# Patient Record
Sex: FEMALE | Race: WHITE | NOT HISPANIC OR LATINO | Employment: OTHER | URBAN - METROPOLITAN AREA
[De-identification: names, ages, dates, MRNs, and addresses within clinical notes are randomized per-mention and may not be internally consistent; named-entity substitution may affect disease eponyms.]

---

## 2017-01-04 ENCOUNTER — ALLSCRIPTS OFFICE VISIT (OUTPATIENT)
Dept: OTHER | Facility: OTHER | Age: 72
End: 2017-01-04

## 2017-01-27 ENCOUNTER — ALLSCRIPTS OFFICE VISIT (OUTPATIENT)
Dept: OTHER | Facility: OTHER | Age: 72
End: 2017-01-27

## 2017-01-27 DIAGNOSIS — E11.65 TYPE 2 DIABETES MELLITUS WITH HYPERGLYCEMIA (HCC): ICD-10-CM

## 2017-02-13 ENCOUNTER — ANESTHESIA (OUTPATIENT)
Dept: GASTROENTEROLOGY | Facility: HOSPITAL | Age: 72
End: 2017-02-13

## 2017-02-13 ENCOUNTER — ANESTHESIA EVENT (OUTPATIENT)
Dept: GASTROENTEROLOGY | Facility: HOSPITAL | Age: 72
End: 2017-02-13

## 2017-03-22 ENCOUNTER — ALLSCRIPTS OFFICE VISIT (OUTPATIENT)
Dept: OTHER | Facility: OTHER | Age: 72
End: 2017-03-22

## 2017-08-10 ENCOUNTER — LAB CONVERSION - ENCOUNTER (OUTPATIENT)
Dept: OTHER | Facility: OTHER | Age: 72
End: 2017-08-10

## 2017-08-10 LAB
CHOLEST SERPL-MCNC: 259 MG/DL (ref 125–200)
CHOLEST/HDLC SERPL: 8.6 (CALC)
CREATININE, RANDOM URINE (HISTORICAL): 141 MG/DL (ref 20–320)
HDLC SERPL-MCNC: 30 MG/DL
LDL CHOLESTEROL (HISTORICAL): ABNORMAL MG/DL (CALC)
MAGNESIUM, UR (HISTORICAL): 1.4 MG/DL
MICROALBUMIN/CREATININE RATIO (HISTORICAL): 10 MCG/MG CREAT
NON-HDL-CHOL (CHOL-HDL) (HISTORICAL): 229 MG/DL (CALC)
TRIGL SERPL-MCNC: 585 MG/DL

## 2017-08-14 ENCOUNTER — GENERIC CONVERSION - ENCOUNTER (OUTPATIENT)
Dept: OTHER | Facility: OTHER | Age: 72
End: 2017-08-14

## 2017-12-20 ENCOUNTER — ALLSCRIPTS OFFICE VISIT (OUTPATIENT)
Dept: OTHER | Facility: OTHER | Age: 72
End: 2017-12-20

## 2017-12-21 NOTE — PROGRESS NOTES
Assessment   1  Change in bowel habits (787 99) (R19 4)   2  Abdominal pain, epigastric (789 06) (R10 13)    Plan   Abdominal pain, epigastric    · EGD; Status:Active; Requested for:57Jwm7119;    Perform:[facility]; Due:23Bii9929; Last Updated By:Geovanni Farias; 12/20/2017 4:12:42 PM;Ordered; For:Abdominal pain, epigastric; Ordered By:Lore Walsh; Change in bowel habits    · Dulcolax 5 MG Oral Tablet Delayed Release; TAKE 2 TABLET ONCE   Rx By: Malena Bazan; Dispense: 1 Days ; #:2 Tablet Delayed Release; Refill: 0;For: Change in bowel habits; MARIA = N; Verified Transmission to [facility] [place] [address]; Last Updated By: System, [employer]; 12/20/2017 4:08:43 PM   · PEG-3350/Electrolytes 236 GM Oral Solution Reconstituted (Golytely); TAKE AS    DIRECTED   Rx By: Malena Bazan; Dispense: 0 Days ; #:1 X 4000 ML Bottle; Refill: 0;For: Change in bowel habits; MARIA = N; Verified Transmission to [facility] [facility]; Last Updated By: System, SureScripts; 12/20/2017 4:08:42 PM   · COLONOSCOPY (GI, SURG); Status:Active; Requested for:50Gwy8551;    Perform:[facility]; Due:62Vva9342; Last Updated By:Geovanni Farias; 12/20/2017 4:12:42 PM;Ordered; For:Change in bowel habits; Ordered By:Lore Walsh; Discussion/Summary   Discussion Summary:     Change in bowel habits with episodes of diarrheaâappeared to be most likely from diabetic autonomic neuropathy  Abdominal pain that has been resolved on Protonix  Reschedule for both EGD and colonoscopy   Patient was explained about the risks and benefits of the procedure  Risks including but not limited to bleeding, infection, perforation were explained in detail  Also explained about less than 100% sensitivity with the exam and other alternatives  Continue Protonix          Counseling Documentation With Imm: The patient was counseled regarding instructions for management,-- risk factor reductions,-- patient and family education,-- risks and benefits of treatment options,-- importance of compliance with treatment  Chief Complaint   Chief Complaint Free Text Note Form:  follow-up      History of Present Illness   HPI:  Dashawn Ocampo came in for regular follow-up  She was seen last year with symptoms of epigastric pain and chronic diarrhea  She was scheduled for EGD and colonoscopy at that time but she never came back because of ride issues  She denies any abdominal pain, nausea or vomiting  She takes Protonix regularly with help  She reports having episodes of loose to soft bowel movements, denies any blood or mucus in the stool  Good appetite, no recent weight loss  Her sister had colon cancer  History Reviewed: The history was obtained today from the patient and I agree with the documented history  Review of Systems   Complete-Female GI Adult:      Constitutional: No fever, no chills, feels well, no tiredness, no recent weight gain or weight loss  Eyes: No complaints of eye pain, no red eyes, no eyesight problems, no discharge, no dry eyes, no itching of eyes  ENT: no complaints of earache, no loss of hearing, no nose bleeds, no nasal discharge, no sore throat, no hoarseness  Cardiovascular: No complaints of slow heart rate, no fast heart rate, no chest pain, no palpitations, no leg claudication, no lower extremity edema  Respiratory: No complaints of shortness of breath, no wheezing, no cough, no SOB on exertion, no orthopnea, no PND  Gastrointestinal: as noted in HPI  Genitourinary: No complaints of dysuria, no incontinence, no pelvic pain, no dysmenorrhea, no vaginal discharge or bleeding  Musculoskeletal: arthralgias-- and-- limb pain, but-- no joint swelling,-- no myalgias,-- no joint stiffness-- and-- no limb swelling  Integumentary: No complaints of skin rash or lesions, no itching, no skin wounds, no breast pain or lump        Neurological: limb weakness-- and-- difficulty walking, but-- no headache,-- no numbness,-- no tingling,-- no confusion,-- no dizziness,-- no convulsions-- and-- no fainting  Psychiatric: Not suicidal, no sleep disturbance, no anxiety or depression, no change in personality, no emotional problems  Endocrine: No complaints of proptosis, no hot flashes, no muscle weakness, no deepening of the voice, no feelings of weakness  Hematologic/Lymphatic: No complaints of swollen glands, no swollen glands in the neck, does not bleed easily, does not bruise easily  Active Problems   1  Abdominal enlargement (789 30) (R19 8)   2  Abdominal pain, epigastric (789 06) (R10 13)   3  Amenorrhea (626 0) (N91 2)   4  Back pain, chronic (724 5,338 29) (M54 9,G89 29)   5  Chronic diarrhea (787 91) (K52 9)   6  Dark stools (792 1) (R19 5)   7  Diabetes mellitus (250 00) (E11 9)   8  Elevated CEA (795 81) (R97 0)   9  Elevated serum creatinine (790 99) (R79 89)   10  Elevated tumor markers (795 89) (R97 8)   11  Encounter for screening mammogram for breast cancer (V76 12) (Z12 31)   12  Essential hypertension with goal blood pressure less than 140/90 (401 9) (I10)   13  Family history of colon cancer (V16 0) (Z80 0)   14  GI bleeding (578 9) (K92 2)   15  Gout (274 9) (M10 9)   16  Hypercholesterolemia (272 0) (E78 00)   17  Influenza vaccine needed (V04 81) (Z23)   18  Kidney lesion (593 9) (N28 9)   19  Medicare annual wellness visit, initial (V70 0) (Z00 00)   20  Menopause (627 2) (Z78 0)   21  Need for tetanus booster (V03 7) (Z23)   22  Non morbid obesity due to excess calories (278 00) (E66 09)   23  Non-compliance with treatment (V15 81) (Z91 19)   24  Screening for colon cancer (V76 51) (Z12 11)   25  Seasonal allergies (477 9) (J30 2)   26  Type 2 diabetes mellitus with hyperglycemia (250 00) (E11 65)    Past Medical History   1  History of Hospital discharge follow-up (V67 59) (Z09)   2   History of Normal vaginal delivery (650) (O80)  Active Problems And Past Medical History Reviewed: The active problems and past medical history were reviewed and updated today  Surgical History   1  History of Appendectomy  Surgical History Reviewed: The surgical history was reviewed and updated today  Family History   Mother    1  Family history of diabetes mellitus (V18 0) (Z83 3)  Son    2  Family history of diabetes mellitus (V18 0) (Z83 3)  Family History Reviewed: The family history was reviewed and updated today  Social History    · Engaged to be    · Never smoker   · No alcohol use   · No drug use  Social History Reviewed: The social history was reviewed and updated today  The social history was reviewed and is unchanged  Current Meds    1  Allopurinol 100 MG Oral Tablet; take 1 tablet by mouth once daily; Therapy: 70BTQ0286 to (Kyleigh Julian)  Requested for: 70BEW5106; Last     Rx:72Wfm2127 Ordered   2  AmLODIPine Besylate 5 MG Oral Tablet; take 1 tablet by mouth once daily; Therapy: 67VLY4298 to (Evaluate:27Sis8726)  Requested for: 15Juf8181; Last     Rx:75Pnq4976 Ordered   3  Aspirin EC Low Dose 81 MG Oral Tablet Delayed Release; take 1 tablet by mouth daily; Therapy: 84Eyy2992 to (Evaluate:23Jcp4507)  Requested for: 30Xgx3123; Last     Rx:52How4813 Ordered   4  Atorvastatin Calcium 40 MG Oral Tablet; TAKE 1 TABLET DAILY; Therapy: 91ZQS5699 to (Evaluate:06Jan2017)  Requested for: 64Bqp9240; Last     Rx:55Zho0108 Ordered   5  BD Pen Needle Mini U/F 31G X 5 MM Miscellaneous; use daily with lantus pen      Requested for: 20Aze4285; Last Rx:89Lbb7908 Ordered   6  Calcium 500/D 500-400 MG-UNIT Oral Tablet Chewable; CHEW 1 TABLET TWICE A DAY; Therapy: 23Iqo7116 to (Evaluate:01Btv2943)  Requested for: 05Twc9394; Last     Rx:67Rnq3151 Ordered   7  Dulcolax 5 MG Oral Tablet Delayed Release; TAKE 2 TABLET ONCE; Therapy: 85ANO3018 to (Evaluate:55Zgz8189)  Requested for: 24VLT9129; Last     Rx:62Jxy4119 Ordered   8   Gabapentin 300 MG Oral Capsule; Take 1-2 capsules TID PRN for pain; Therapy: 97QDM9229 to (Evaluate:29Jan2018)  Requested for: 14ZVS0218; Last     Rx:02Nov2017 Ordered   9  Glimepiride 2 MG Oral Tablet; TAKE 1 TABLET BY MOUTH DAILY WITH BREAKFAST; Therapy: 67SUH1618 to (Evaluate:28Jan2018)  Requested for: 30Oct2017; Last     Rx:30Oct2017 Ordered   10  Januvia 100 MG Oral Tablet; take 1 tablet by mouth once daily; Therapy: 51AOV0020 to (Evaluate:10Mar2017)  Requested for: 74FFF7568; Last      Rx:10Nov2016 Ordered   11  Lantus SoloStar 100 UNIT/ML Subcutaneous Solution Pen-injector; Inject 20 units at      bedtime; Therapy: 23Juy7135 to (Evaluate:04Dec2017)  Requested for: 49Hyx0512; Last      Rx:53Ulo4535 Ordered   12  Levocetirizine Dihydrochloride 5 MG Oral Tablet; TAKE 1 TABLET DAILY; Therapy: 09Kwo4575 to (Christina Alcantara)  Requested for: 62HBS8025; Last      Rx:02Nov2017 Ordered   13  Lisinopril 10 MG Oral Tablet; take 1 tablet by mouth daily; Therapy: 08NYR7306 to (21 )  Requested for: 85CMU2972; Last      Rx:75Wps7476 Ordered   14  MetFORMIN HCl - 1000 MG Oral Tablet; take 1 tablet by mouth twice a day with food; Therapy: 39HTC6837 to (472 50 762)  Requested for: 65CXI4014; Last      Rx:30Oct2017 Ordered   15  MetFORMIN HCl - 500 MG Oral Tablet; TAKE 2 TABLETS TWICE DAILY WITH MEALS; Therapy: 61AQP3166 to (Evaluate:31May2017)  Requested for: 70WIG2495; Last      Rx:01May2017 Ordered   16  Pantoprazole Sodium 40 MG Oral Tablet Delayed Release; take 1 tablet by mouth daily; Therapy: 76YKB0736 to (Dakota Villarreal)  Requested for: 72TGL3884; Last      Rx:79Ulb6179 Ordered   17  PEG-3350/Electrolytes 236 GM Oral Solution Reconstituted; TAKE AS DIRECTED; Therapy: 36UZE7682 to (Last Rx:08Dec2016)  Requested for: 72RBM9728 Ordered   18   Welchol 625 MG Oral Tablet; TAKE 3 TABLETS DAILY  Requested for: 37Jxl4941; Last      Rx:46Jvn5938 Ordered  Medication List Reviewed: The medication list was reviewed and updated today  Allergies   1  No Known Drug Allergies    Vitals   Vital Signs    Recorded: 71Fcr6751 03:58PM   Temperature 97 5 F   Heart Rate 83   Respiration 18   Systolic 784   Diastolic 88   Height 5 ft    Weight 195 lb    BMI Calculated 38 08   BSA Calculated 1 85   O2 Saturation 98     Physical Exam        Constitutional      General appearance: No acute distress, well appearing and well nourished  Eyes      Conjunctiva and lids: No swelling, erythema or discharge  Pupils and irises: Equal, round and reactive to light  Ears, Nose, Mouth, and Throat      External inspection of ears and nose: Normal        Oropharynx: Normal with no erythema, edema, exudate or lesions  Pulmonary      Respiratory effort: No increased work of breathing or signs of respiratory distress  Auscultation of lungs: Clear to auscultation  Cardiovascular      Palpation of heart: Normal PMI, no thrills  Auscultation of heart: Normal rate and rhythm, normal S1 and S2, without murmurs  Examination of extremities for edema and/or varicosities: Normal        Carotid pulses: Normal        Abdomen      Abdomen: Non-tender, no masses  Liver and spleen: No hepatomegaly or splenomegaly  Lymphatic      Palpation of lymph nodes in neck: No lymphadenopathy  Musculoskeletal      Gait and station: Normal        Digits and nails: Normal without clubbing or cyanosis  Inspection/palpation of joints, bones, and muscles: Normal        Skin      Skin and subcutaneous tissue: Normal without rashes or lesions  Psychiatric      Orientation to person, place, and time: Normal        Mood and affect: Normal           Future Appointments      Date/Time Provider Specialty Site   01/22/2018 01:00 PM KAY Oquendo   Gastroenterology Adult [address]     Signatures    Electronically signed by : Montez Carvajal KAY Tineo ; Dec 20 2017  4:21PM EST                       (Author)

## 2018-01-09 NOTE — RESULT NOTES
Message   A1C has improved from prior result  Continue positive lifestyle modifications and adherence to medication regimen  Verified Results  (Q) HEMOGLOBIN A1c 08QEN6711 11:56AM Arnaldo Juarez   REPORT COMMENT:  FASTING:NO     Test Name Result Flag Reference   HEMOGLOBIN A1c 8 6 % of total Hgb H <5 7   According to ADA guidelines, hemoglobin A1c <7 0%  represents optimal control in non-pregnant diabetic  patients  Different metrics may apply to specific  patient populations  Standards of Medical Care in    Diabetes Care  2013;36:s11-s66     For the purpose of screening for the presence of  diabetes  <5 7%       Consistent with the absence of diabetes  5 7-6 4%    Consistent with increased risk for diabetes              (prediabetes)  >or=6 5%    Consistent with diabetes     This assay result is consistent with diabetes  mellitus  Currently, no consensus exists for use of hemoglobin  A1c for diagnosis of diabetes for children  (Q) CBC (H/H, RBC, INDICES, WBC, PLT) 70IWJ5508 11:56AM Arnaldo Juarez   REPORT COMMENT:  FASTING:NO     Test Name Result Flag Reference   WHITE BLOOD CELL COUNT 6 5 Thousand/uL  3 8-10 8   RED BLOOD CELL COUNT 3 60 Million/uL L 3 80-5 10   HEMOGLOBIN 11 9 g/dL  11 7-15 5   HEMATOCRIT 34 6 % L 35 0-45 0   MCV 96 2 fL  80 0-100 0   MCH 32 9 pg  27 0-33 0   MCHC 34 2 g/dL  32 0-36 0   RDW 12 9 %  11 0-15 0   PLATELET COUNT 376 Thousand/uL  140-400   MPV 9 1 fL  7 5-11 5     * DXA BONE DENSITY SPINE HIP AND PELVIS 29Jun2016 01:09PM [facility] Order Number: [id number]     Test Name Result Flag Reference   DXA BONE DENSITY SPINE HIP AND PELVIS (Report)     CENTRAL DXA SCAN     CLINICAL HISTORY:  80year old post-menopausal  female risk factors include history of smoking  Degenerative arthritis and non-insulin-dependent diabetes  Osteoporosis screening  TECHNIQUE: Bone densitometry was performed using a [employer] Horizon A  bone densitometer  Regions of interest appear properly placed  There are no obvious fractures or other confounding variables which could limit the study  Degenerative changes of the   lumbar spine and hip  This will falsely elevate the bone mineral densities in these regions  COMPARISON: None  RESULTS:    LUMBAR SPINE: L1-L4:   BMD 1 0 67 gm/cm2   T-score 0 2   Z-score 2 3     LUMBAR SPINE L1, L2 and L4 (average) : BMD 1 008 gm/sq-cm        T-score is -0 2         Z-score is 1 9          LEFT TOTAL HIP:   BMD 1 005 gm/cm2   T-score 0 5   Z-score 2 0     LEFT FEMORAL NECK:   BMD 0 653 gm/cm2   T-score -1 8   Z-score 0 0           ASSESSMENT:   1  Based on the WHO classification, the T-score of -1 8 in the left hip is consistent with low bone mineral density  2  The 10 year risk of hip fracture is 2 % with the 10 year risk of major osteoporotic fracture being 10 % as calculated by the WHO fracture risk assessment tool (FRAX)  The current NOF guidelines recommend treating patients with FRAX 10 year risk    score of >3% for hip fracture and >20% for major osteoporotic fracture  3  Any secondary causes of low bone mineral density should be excluded prior to treatment, if clinically indicated  4  A daily intake of at least 1200 mg calcium and 800 - 1000 IU of Vitamin D, as well as weight bearing and muscle strengthening exercise, fall prevention and avoidance of tobacco and excessive alcohol intake as basic preventive measures are suggested                       WHO CLASSIFICATION:   Normal (a T-score of -1 0 or higher)   Low bone mineral density (a T-score of less than -1 0 but higher than -2 5)   Osteoporosis (a T-score of -2 5 or less)   Severe osteoporosis (a T-score of -2 5 or less with a fragility fracture)             Workstation performed: [id number]     [facility] SCREENING BILATERAL W CAD 82Lst6906 01:09PM Ann-Marie HINOJOSA Order Number: [id number]     Test Name Result Flag Reference   MAMMO SCREENING BILATERAL W CAD (Report)     Patient History:   Patient is postmenopausal    Family history of unknown metastatic cancer in brother at age 61  Patient is an every day smoker, and has smoked for 60 years  Patient's BMI is 37 4  Reason for exam: screening (asymptomatic)  Mammo Screening Bilateral W CAD: June 29, 2016 - Check In #:    [de-identified]   Bilateral MLO and CC view(s) were taken  XCCL view(s) were taken   of the left breast      Technologist: Lam Tay RT(R)(M)   Prior study comparison: May 1, 2007, bilateral screening    mammogram, performed at [place] [address]  February 3, 2006, mammogram      The breast tissue is almost entirely fat  No new dominant soft    tissue mass, architectural distortion or suspicious    calcifications are noted  The skin and nipple structures are    within normal limits  Benign appearing calcifications are noted  Stable nodular density present in the left anterior breast        No mammographic evidence of malignancy  No    significant changes when compared with prior studies  ASSESSMENT: BiRad:2 - Benign     Recommendation:   Routine screening mammogram of both breasts in 1 year  A    reminder letter will be scheduled  Analyzed by CAD     8-10% of cancers will be missed on mammography  Management of a    palpable abnormality must be based on clinical grounds  Patients   will be notified of their results via letter from our facility  Accredited by [employer] of [employer] and FDA       Transcription Location: [facility] 98: [id number]     Risk Value(s):   Tyrer-Cuzick 10 Year: 3 128%, Tyrer-Cuzick Lifetime: 4 957%,    Myriad Table: 1 5%, MARYAM 5 Year: 1 7%, NCI Lifetime: 5 0%   Signed by:   Rema Johnson MD   6/30/16       Signatures   Electronically signed by : KAY Wylie ; Dec 21 2016  5:53PM EST                       (Author)

## 2018-01-10 NOTE — MISCELLANEOUS
Provider Comments  Provider Comments:   PT NO SHOWED TODAY      Signatures   Electronically signed by : Razia Davis, ; Feb 9 2016  3:11PM EST                       (Author)    Electronically signed by : Ewa Sheldon DO; Feb 10 2016  9:30PM EST                       (Author)    Electronically signed by : Natacha Delgado DO; Feb 12 2016  4:21PM EST                       (Author)

## 2018-01-11 NOTE — MISCELLANEOUS
August 14, 2017      Dear Bridger Loud,    We have been trying to reach you by phone  We have received recent blood work results  Please call  [phone] to schedule an appointment      Sincerely,    [address]      Electronically signed Aguilar Chaudhari   Aug 14 2017  4:49PM EST

## 2018-01-12 NOTE — PROGRESS NOTES
Plan    1  DSMT/MNT Time Record; Status:Complete;   Done: 74TQO8435 04:22PM    Discussion/Summary    PATIENT EDUCATION RECORD   Indication for Services: type 2 Diabetes Mellitus  She is ready to learn  She has limited reading skills barriers to learning  Diabetes Disease Process:   She understands the pathophysiology of diabetes: Method: Instruction  Response: Needs Review   Discussed patient's type of diabetes: Method: Instruction  Response: Needs Review   Discussed treatment goals: Method: Instruction  Response: Needs Review   Discussed benefits of control: Method: Instruction  Response: Needs Review   Healthy Eating:   Discussed general nutrition topics: Method: Instruction  Response: Verbalizes Understanding and Needs Review   Being Active:   Stated the benefits of exercise: Method: Instruction  Response: Verbalizes Understanding  Recommend she discuss a structured exercise program with her PCP/Cardiologist    Monitoring:   Discussed option for monitoring HgbA1c: Method: Instruction and Handout  Response: Needs Review  Discussed target blood glucose ranges: Method: Instruction  Response: Needs Review  Discussed target hemoglobin A1c: Method: Instruction  Response: Needs Review  Discussed testing times: Method: Instruction  Response: Verbalizes Understanding  Discussed safe lancet disposal: Method: Instruction  Response: Verbalizes Understanding  Discussed options for record keeping: Method: Instruction  Response: Verbalizes Understanding  She was given Fast 15 List   Problem Solving: She is on medications that cause hypoglycemia   Hypoglycemia: Stated definition and causes: Method: Instruction and Handout  Response: Needs Review   Described signs and symptoms: Method: Instruction and Handout  Response: Needs Review   Discussed prevention: Method: Instruction and Handout  Response: Needs Review   Hyperglycemia: Stated definition and causes: Method: Instruction and Handout  Response: Needs Review Described signs and symptoms: Method: Instruction  Response: Needs Review Will be seen for follow up in 2 -3 weeks  Chief Complaint  Uncontrolled type 2 diabetes- here for class assessment      History of Present Illness  Aren Rousseau is accompanied by her fiance, she has limited reading skills and he is helping her with reading information and her diabetes management  Aren Rousseau would do best with individual education geared towards her specific needs  Her fiance is helping her by setting up her medications and he set the insulin pen with her dose and she administers  No areas of hypertrophy were noted at her injection sites  We discussed a rotation pattern for injecting  She is still smoking but has cut back to only 2 cigarettes a day  She does have transportation issues with getting to her appointments and tries to schedule in the [facility] area  She has started to exercise and her insurance has the silver sneaker option  She is able to walk for short distances because of her back problems  She has been eating 2 meals a day and we discussed her having something midday  It was explained that her Lantus and Glimepiride put her at risk for hypoglycemia  She is testing her glucoses and I discussed paired testing pre and post meals  She was given a log sheet to document her reading  I discussed glucose target for ADA as her goal  They are interested in improving her glycemic control and in learning more about diabetes  She would benefit from an MNT consult  I will have her see me in a 2 -3 weeks for further education  She is to bring in her glucose logs at the next visit  Social History    · Never smoker    Allergies    1   No Known Drug Allergies    Results/Data  Encounter Results   DSMT/MNT Time Record [id number] 04:22PM Altaf Kelsey     Test Name Result Flag Reference   Date of Service 6/13/16     Start - Stop Time 11:10 AM - 12:10 AM     Total MInutes 60     Group Or Individual Instruction ind       End of Encounter Meds    1  Pantoprazole Sodium 40 MG Oral Tablet Delayed Release; Take 1 tablet daily    Requested for: 11Apr2016; Last Rx:11Apr2016 Ordered    2  Aspirin EC Low Dose 81 MG Oral Tablet Delayed Release; take 1 tablet by mouth once   daily; Therapy: 63Gnh2552 to (Evaluate:04Dsy8974)  Requested for: 01Apr2016; Last   Rx:01Apr2016 Ordered   3  Zoster (Zostavax) (Zoster (Zostavax)); INJECT 0 5 ML Once Pharmacy to administer   vaccine after active infection on RUE has resolved; Therapy: 63EXD7583 to (Last Rx:11Mar2016) Ordered    4  Allopurinol 100 MG Oral Tablet; take 1 tablet by mouth daily as directed; Therapy: 63AGH3560 to (Evaluate:11Aug2016)  Requested for: 44EHT0800; Last   Rx:37Ngi2369 Ordered   5  Welchol 625 MG Oral Tablet; TAKE 3 TABLETS DAILY  Requested for: 10Mgd9987; Last   Rx:23Feb2016 Ordered    6  Aspirin 81 MG TABS; TAKE 1 TABLET DAILY; Therapy: 65WBT0307 to (Evaluate:25Apr2016)  Requested for: 44IZX5572; Last   Rx:85Asq6882 Ordered   7  Calcium 500/D 500-400 MG-UNIT Oral Tablet Chewable; take 1 tablet by mouth twice a   day; Therapy: 24Qbd6323 to (Evaluate:10Aug2016)  Requested for: 12Apr2016; Last   Rx:12Apr2016 Ordered    8  Atorvastatin Calcium 40 MG Oral Tablet; TAKE 1 TABLET DAILY; Therapy: 09FBW9996 to (Evaluate:06Jan2017)  Requested for: 11Apr2016; Last   Rx:11Apr2016 Ordered    9  Calcium 500 MG Oral Tablet; TAKE 1 TABLET DAILY  Requested for: 86Ick2208; Last   Rx:11Apr2016 Ordered    10  Levocetirizine Dihydrochloride 5 MG Oral Tablet; TAKE 1 TABLET DAILY; Therapy: 43Yhn5037 to (Campbell Ramirez)  Requested for: 12Apr2016; Last    Rx:11Apr2016 Ordered    11  BD Pen Needle Mini U/F 31G X 5 MM Miscellaneous; use daily with lantus pen     Requested for: 53Vxa7428; Last Rx:07Ofk7999 Ordered   12  Gabapentin 300 MG Oral Capsule; Take 1-2 capsules TID PRN for pain; Therapy: 01OBZ1583 to (Evaluate:13Jun2016)  Requested for: 12NYO9216; Last    Rx:15Mar2016 Ordered   13   Glimepiride 2 MG Oral Tablet; TAKE 1 TABLET DAILY WITH BREAKFAST; Last    Rx:67Nuz1204 Ordered   14  Lantus SoloStar 100 UNIT/ML Subcutaneous Solution Pen-injector; INJECT 20 UNIT    Bedtime; Therapy: 42Aah0544 to (Evaluate:89Wjt6510)  Requested for: 24Obq0519; Last    Rx:39Tfd2969 Ordered   15  Lisinopril 10 MG Oral Tablet; take 1 tablet by mouth daily; Therapy: 74QBX4868 to (Evaluate:11Aug2016)  Requested for: 82ZEY9707; Last    Rx:45Ged9648 Ordered   16  MetFORMIN HCl - 500 MG Oral Tablet; TAKE 1 TABLET TWICE DAILY;     Therapy: 78CTW8935 to (Evaluate:24Qbs7876)  Requested for: 07Jun2016; Last    Rx:07Jun2016 Ordered    Future Appointments    Date/Time Provider Specialty Site   07/13/2016 09:40 AM Janice Irby, DO Gastroenterology Adult [place] [address]   Electronically signed by : ARTIS Hathaway; Jun 14 2016  4:48PM EST                       (Author)    Electronically signed by : KAY Jones ; Jared 15 2016  8:07AM EST

## 2018-01-13 VITALS
HEIGHT: 60 IN | RESPIRATION RATE: 16 BRPM | SYSTOLIC BLOOD PRESSURE: 120 MMHG | DIASTOLIC BLOOD PRESSURE: 76 MMHG | TEMPERATURE: 99 F | WEIGHT: 181 LBS | HEART RATE: 78 BPM | BODY MASS INDEX: 35.53 KG/M2

## 2018-01-13 VITALS
WEIGHT: 181 LBS | HEIGHT: 60 IN | TEMPERATURE: 99.3 F | DIASTOLIC BLOOD PRESSURE: 64 MMHG | RESPIRATION RATE: 16 BRPM | HEART RATE: 78 BPM | SYSTOLIC BLOOD PRESSURE: 130 MMHG | BODY MASS INDEX: 35.53 KG/M2

## 2018-01-13 NOTE — PROGRESS NOTES
Chief Complaint  Chief Complaint Free Text Note Form: Individual diabetes education  History of Present Illness  HPI: Adia Pineda was seen along with her fiance for continuing diabetes education  Adia Pineda has had a hypoglycemic episode when she missed her meal due to an appointment  We discussed how to handle situations when she will not be home for a meal   We reviewed hypo/hyper symptoms and treatment  Adia Pineda is carry some fast acting carbs with her  1 also discussed with her foot care, eye exam, stress effects on glucoses, Adia Pineda does like to draw and paint when she becomes stressed  She is frustrated that her walking ability is limited due to her spine issues and pain  She does walk for short distances, as she can tolerate  She is to schedule an appointment with the dietician and the eye doctor  Current Meds   1  Allopurinol 100 MG Oral Tablet; take 1 tablet by mouth daily as directed; Therapy: 50CJY4310 to (Evaluate:11Aug2016)  Requested for: 54WNP0229; Last   Rx:89Lol3904 Ordered   2  Aspirin EC Low Dose 81 MG Oral Tablet Delayed Release; take 1 tablet by mouth once   daily; Therapy: 52Zyo6676 to (Evaluate:22Zip3528)  Requested for: 01Apr2016; Last   Rx:01Apr2016 Ordered   3  Atorvastatin Calcium 40 MG Oral Tablet; TAKE 1 TABLET DAILY; Therapy: 80AMF0907 to (Evaluate:06Jan2017)  Requested for: 11Apr2016; Last   Rx:11Apr2016 Ordered   4  BD Pen Needle Mini U/F 31G X 5 MM Miscellaneous; use daily with lantus pen    Requested for: 44Yjq7216; Last Rx:27Pzc2923 Ordered   5  Calcium 500/D 500-400 MG-UNIT Oral Tablet Chewable; take 1 tablet by mouth twice a   day; Therapy: 16Hyh4973 to (Evaluate:10Aug2016)  Requested for: 32Qst5949; Last   Rx:12Apr2016 Ordered   6  Gabapentin 300 MG Oral Capsule; Take 1-2 capsules TID PRN for pain; Therapy: 53KLQ5795 to (Evaluate:13Jun2016)  Requested for: 39GDS8251; Last   Rx:15Mar2016 Ordered   7   Glimepiride 2 MG Oral Tablet; TAKE 1 TABLET DAILY WITH BREAKFAST  Requested for:   71Tul2997; Last Rx:99Jzz4557 Ordered   8  Lantus SoloStar 100 UNIT/ML Subcutaneous Solution Pen-injector; INJECT 20 UNIT   Bedtime; Therapy: 65Trs6570 to (Evaluate:21Wvl0941)  Requested for: 32Zea8643; Last   Rx:02Arn9079 Ordered   9  Levocetirizine Dihydrochloride 5 MG Oral Tablet; TAKE 1 TABLET DAILY; Therapy: 66Njl1336 to (Josie Bhardwaj)  Requested for: 00Jcj6090; Last   Rx:68Jna3136 Ordered   10  Lisinopril 10 MG Oral Tablet; take 1 tablet by mouth daily; Therapy: 82LQU4202 to (Evaluate:87Fjo4436)  Requested for: 24MSX1011; Last    Rx:12Eer3126 Ordered   11  MetFORMIN HCl - 500 MG Oral Tablet; TAKE 1 TABLET TWICE DAILY; Therapy: 93GHY7571 to (Evaluate:01Vud4878)  Requested for: 05OZP8307; Last    Rx:31Czp7923 Ordered   12  Pantoprazole Sodium 40 MG Oral Tablet Delayed Release; Take 1 tablet daily     Requested for: 91Kjs5259; Last Rx:50Etx5262 Ordered   13  Welchol 625 MG Oral Tablet; TAKE 3 TABLETS DAILY  Requested for: 47Gty7684; Last    Rx:49Tyg7273 Ordered    Plan    1  DSMT/MNT Time Record; Status:Complete;   Done: 25NLC1114 02:13PM    Discussion/Summary  Patient Education Record:   PATIENT EDUCATION RECORD   Indication for Services: type 2 Diabetes Mellitus  She is ready to learn  Reducing Risk:   Discussed yearly recommended lab tests  Method: Instruction  Response: Needs Review  Discussed yearly recommended exams  Method: Instruction  Response: Needs Review  Discussed what is to be done at each visit with Provider: HCP,Blood Pressure, Foot Exam  Method: Instruction  Response: Needs Review  Discussed Physician/HCP visit schedule  Method: Instruction  Response: Needs Review  Discussed ABC's of control  Method: Instruction  Response: Needs Review  Discussed appropriate diabetic foot care  Method: Instruction  Response: Needs Review  Discussed prevention of heart disease  Method: Instruction  Response: Needs Review     Healthy Coping Class:   Discussed the effects of stress on diabetes: Method: Instruction  Response: Verbalizes Understanding   Discussed ways to manage stress: Method: Instruction  Response: Verbalizes Understanding      Future Appointments    Date/Time Provider Specialty Site   08/09/2016 01:00 PM Dylan Gr DO Family Medicine  [facility]     Signatures   Electronically signed by : ARTIS Correa; Jul 26 2016  2:22PM EST                       (Author)    Electronically signed by : KAY Bello ; Jul 27 2016  8:27AM EST

## 2018-01-13 NOTE — RESULT NOTES
Message  BIRADS-2  Recommend routine screening 1 year  Verified Results  * DXA BONE DENSITY SPINE HIP AND PELVIS 29Jun2016 01:09PM Jinny Navarro Order Number: [id number]     Test Name Result Flag Reference   DXA BONE DENSITY SPINE HIP AND PELVIS (Report)     CENTRAL DXA SCAN     CLINICAL HISTORY:  80year old post-menopausal  female risk factors include history of smoking  Degenerative arthritis and non-insulin-dependent diabetes  Osteoporosis screening  TECHNIQUE: Bone densitometry was performed using a Hologic Horizon A  bone densitometer  Regions of interest appear properly placed  There are no obvious fractures or other confounding variables which could limit the study  Degenerative changes of the   lumbar spine and hip  This will falsely elevate the bone mineral densities in these regions  COMPARISON: None  RESULTS:    LUMBAR SPINE: L1-L4:   BMD 1 0 67 gm/cm2   T-score 0 2   Z-score 2 3     LUMBAR SPINE L1, L2 and L4 (average) : BMD 1 008 gm/sq-cm        T-score is -0 2         Z-score is 1 9          LEFT TOTAL HIP:   BMD 1 005 gm/cm2   T-score 0 5   Z-score 2 0     LEFT FEMORAL NECK:   BMD 0 653 gm/cm2   T-score -1 8   Z-score 0 0           ASSESSMENT:   1  Based on the WHO classification, the T-score of -1 8 in the left hip is consistent with low bone mineral density  2  The 10 year risk of hip fracture is 2 % with the 10 year risk of major osteoporotic fracture being 10 % as calculated by the WHO fracture risk assessment tool (FRAX)  The current NOF guidelines recommend treating patients with FRAX 10 year risk    score of >3% for hip fracture and >20% for major osteoporotic fracture  3  Any secondary causes of low bone mineral density should be excluded prior to treatment, if clinically indicated     4  A daily intake of at least 1200 mg calcium and 800 - 1000 IU of Vitamin D, as well as weight bearing and muscle strengthening exercise, fall prevention and avoidance of tobacco and excessive alcohol intake as basic preventive measures are suggested  WHO CLASSIFICATION:   Normal (a T-score of -1 0 or higher)   Low bone mineral density (a T-score of less than -1 0 but higher than -2 5)   Osteoporosis (a T-score of -2 5 or less)   Severe osteoporosis (a T-score of -2 5 or less with a fragility fracture)             Workstation performed: [id number]     [facility] SCREENING BILATERAL W CAD 53Mhy3210 01:09PM Ann-Marie Mosquera    Order Number: [id number]     Test Name Result Flag Reference   MAMMO SCREENING BILATERAL W CAD (Report)     Patient History:   Patient is postmenopausal    Family history of unknown metastatic cancer in brother at age 61  Patient is an every day smoker, and has smoked for 60 years  Patient's BMI is 37 4  Reason for exam: screening (asymptomatic)  Mammo Screening Bilateral W CAD: June 29, 2016 - Check In #:    [de-identified]   Bilateral MLO and CC view(s) were taken  XCCL view(s) were taken   of the left breast      Technologist: RT Judy(TOMY)(M)   Prior study comparison: May 1, 2007, bilateral screening    mammogram, performed at [place] [address]  February 3, 2006, mammogram      The breast tissue is almost entirely fat  No new dominant soft    tissue mass, architectural distortion or suspicious    calcifications are noted  The skin and nipple structures are    within normal limits  Benign appearing calcifications are noted  Stable nodular density present in the left anterior breast        No mammographic evidence of malignancy  No    significant changes when compared with prior studies  ASSESSMENT: BiRad:2 - Benign     Recommendation:   Routine screening mammogram of both breasts in 1 year  A    reminder letter will be scheduled  Analyzed by CAD     8-10% of cancers will be missed on mammography  Management of a    palpable abnormality must be based on clinical grounds  Patients   will be notified of their results via letter from our facility  Accredited by [employer] of [employer] and FDA       Transcription Location: TOMY Phelps 98: [id number]     Risk Value(s):   Tyrer-Cuzick 10 Year: 3 128%, Tyrer-Cuzick Lifetime: 4 957%,    Myriad Table: 1 5%, MARYAM 5 Year: 1 7%, NCI Lifetime: 5 0%   Signed by:   Abena Weir MD   6/30/16       Signatures   Electronically signed by : KAY Weeks ; Dec 10 2016 10:19AM EST                       (Author)

## 2018-01-13 NOTE — RESULT NOTES
Verified Results  (Q) HEMOGLOBIN A1c 97GQZ1130 11:56AM Arnaldo Juarez   REPORT COMMENT:  FASTING:NO     Test Name Result Flag Reference   HEMOGLOBIN A1c 8 6 % of total Hgb H <5 7   According to ADA guidelines, hemoglobin A1c <7 0%  represents optimal control in non-pregnant diabetic  patients  Different metrics may apply to specific  patient populations  Standards of Medical Care in    Diabetes Care  2013;36:s11-s66     For the purpose of screening for the presence of  diabetes  <5 7%       Consistent with the absence of diabetes  5 7-6 4%    Consistent with increased risk for diabetes              (prediabetes)  >or=6 5%    Consistent with diabetes     This assay result is consistent with diabetes  mellitus  Currently, no consensus exists for use of hemoglobin  A1c for diagnosis of diabetes for children         Signatures   Electronically signed by : KAY Salmon ; Dec 13 2016 12:45PM EST                       (Author)

## 2018-01-13 NOTE — MISCELLANEOUS
Provider Comments  Provider Comments:   PT WAS A NO SHOW TODAY      Signatures   Electronically signed by : Khris Ramirez, ; Oct 26 2016  3:57PM EST                       (Author)

## 2018-01-14 NOTE — MISCELLANEOUS
Reason For Visit  Reason For Visit Free Text Note Form: Received request to provide pt  with information regarding food [employer] and food pantries  This information has been mailed to pt  today along with contact information for  at [address] [place] [address] office  Copy of letter scanned to EMR  Will continue to be available to provide support  Active Problems    1  Abdominal enlargement (789 30) (R19 8)   2  Amenorrhea (626 0) (N91 2)   3  Diabetes mellitus (250 00) (E11 9)   4  Elevated CEA (795 81) (R97 0)   5  Elevated serum creatinine (790 99) (R79 89)   6  Elevated tumor markers (795 89) (R97 8)   7  Encounter for screening mammogram for breast cancer (V76 12) (Z12 31)   8  Gout (274 9) (M10 9)   9  Hypercholesterolemia (272 0) (E78 0)   10  Kidney lesion (593 9) (N28 9)   11  Medicare annual wellness visit, initial (V70 0) (Z00 00)   12  Menopause (627 2) (Z78 0)   13  Need for tetanus booster (V03 7) (Z23)   14  Non morbid obesity due to excess calories (278 00) (E66 09)   15  Screening for colon cancer (V76 51) (Z12 11)   16  Type 2 diabetes mellitus with hyperglycemia (250 00) (E11 65)    Current Meds   1  Allopurinol 100 MG Oral Tablet; TAKE 1 TABLET DAILY AS DIRECTED  Requested for:   06Skg6233; Last Rx:11Tpd2487 Ordered   2  Aspirin 81 MG Oral Tablet; TAKE 1 TABLET DAILY; Therapy: 59OOJ2732 to (Evaluate:25Apr2016)  Requested for: 82UYL0714; Last   Rx:56Vll8977 Ordered   3  Atorvastatin Calcium 20 MG Oral Tablet; TAKE 1 TABLET AT BEDTIME; Therapy: 83INQ6921 to (Evaluate:09Jun2016)  Requested for: 95HNT4746; Last   Rx:11Mar2016 Ordered   4  BD Pen Needle Mini U/F 31G X 5 MM Miscellaneous; use daily with lantus pen    Requested for: 49Gpk3611; Last Rx:27Opn8133 Ordered   5  Calcium 500 MG Oral Tablet; TAKE 1 TABLET DAILY  Requested for: 55Kgj8940; Last   Rx:64Xzj5883 Ordered   6  Gabapentin 300 MG Oral Capsule; Take 1-2 capsules TID PRN for pain;    Therapy: 74CUC6275 to (Evaluate:13Jun2016)  Requested for: 41QRP2728; Last   Rx:15Mar2016 Ordered   7  Glimepiride 2 MG Oral Tablet; TAKE 1 TABLET DAILY WITH BREAKFAST; Last   Rx:23Feb2016 Ordered   8  Lantus SoloStar 100 UNIT/ML Subcutaneous Solution Pen-injector; INJECT 20 UNIT   Bedtime; Therapy: 13Lhk0423 to (Evaluate:37Prx9606)  Requested for: 31Cqi6788; Last   Rx:77Cej2601 Ordered   9  Lisinopril 10 MG Oral Tablet; take 1 tablet by mouth every day; Therapy: 06BOA1414 to (Bharat Arzate)  Requested for: 20Fno6873; Last   Rx:23Feb2016 Ordered   10  MetFORMIN HCl - 500 MG Oral Tablet; TAKE 1 TABLET TWICE DAILY; Therapy: 56HZG3438 to (Evaluate:38Kjc2221)  Requested for: 65QAY2942; Last    Rx:11Mar2016 Ordered   11  Pantoprazole Sodium 40 MG Oral Tablet Delayed Release; Take 1 tablet daily     Requested for: 01Dqb6450; Last Rx:23Feb2016 Ordered   12  Welchol 625 MG Oral Tablet; TAKE 3 TABLETS DAILY  Requested for: 00Jni1001; Last    Rx:23Feb2016 Ordered   13  Zoster (Zostavax) (Zoster (Zostavax)); INJECT 0 5 ML Once Pharmacy to administer    vaccine after active infection on RUE has resolved; Therapy: 28KNM3108 to (Last Rx:11Mar2016) Ordered    Allergies    1   No Known Drug Allergies    Signatures   Electronically signed by : EDILIA Aquino; Mar 15 2016 10:56AM EST                       (Author)

## 2018-01-15 ENCOUNTER — ANESTHESIA EVENT (OUTPATIENT)
Dept: PERIOP | Facility: AMBULARY SURGERY CENTER | Age: 73
End: 2018-01-15
Payer: COMMERCIAL

## 2018-01-15 NOTE — MISCELLANEOUS
Provider Comments  Provider Comments:   PT NO SHOW FOR APPT      Signatures   Electronically signed by : Jeff Candelaria, ; Apr 25 2016  4:59PM EST                       (Author)    Electronically signed by : Arcadio Reza, ; Apr 25 2016  5:06PM EST                       (Author)    Electronically signed by :  Justen Cedillo MD; Apr 25 2016  5:07PM EST                       (Author)

## 2018-01-16 NOTE — MISCELLANEOUS
Provider Comments  Provider Comments:   PATIENT NO SHOWED TODAYS APPT      Signatures   Electronically signed by : Ekta Vincent, ; Jan 4 2017  1:58PM EST                       (Author)

## 2018-01-16 NOTE — RESULT NOTES
Message  Low bone mineral density - not osteoporosis  Verified Results  * DXA BONE DENSITY SPINE HIP AND PELVIS 29Jun2016 01:09PM Lee Pear Order Number: [id number]     Test Name Result Flag Reference   DXA BONE DENSITY SPINE HIP AND PELVIS (Report)     CENTRAL DXA SCAN     CLINICAL HISTORY:  80year old post-menopausal  female risk factors include history of smoking  Degenerative arthritis and non-insulin-dependent diabetes  Osteoporosis screening  TECHNIQUE: Bone densitometry was performed using a Hologic Horizon A  bone densitometer  Regions of interest appear properly placed  There are no obvious fractures or other confounding variables which could limit the study  Degenerative changes of the   lumbar spine and hip  This will falsely elevate the bone mineral densities in these regions  COMPARISON: None  RESULTS:    LUMBAR SPINE: L1-L4:   BMD 1 0 67 gm/cm2   T-score 0 2   Z-score 2 3     LUMBAR SPINE L1, L2 and L4 (average) : BMD 1 008 gm/sq-cm        T-score is -0 2         Z-score is 1 9          LEFT TOTAL HIP:   BMD 1 005 gm/cm2   T-score 0 5   Z-score 2 0     LEFT FEMORAL NECK:   BMD 0 653 gm/cm2   T-score -1 8   Z-score 0 0           ASSESSMENT:   1  Based on the WHO classification, the T-score of -1 8 in the left hip is consistent with low bone mineral density  2  The 10 year risk of hip fracture is 2 % with the 10 year risk of major osteoporotic fracture being 10 % as calculated by the WHO fracture risk assessment tool (FRAX)  The current NOF guidelines recommend treating patients with FRAX 10 year risk    score of >3% for hip fracture and >20% for major osteoporotic fracture  3  Any secondary causes of low bone mineral density should be excluded prior to treatment, if clinically indicated     4  A daily intake of at least 1200 mg calcium and 800 - 1000 IU of Vitamin D, as well as weight bearing and muscle strengthening exercise, fall prevention and avoidance of tobacco and excessive alcohol intake as basic preventive measures are suggested                       WHO CLASSIFICATION:   Normal (a T-score of -1 0 or higher)   Low bone mineral density (a T-score of less than -1 0 but higher than -2 5)   Osteoporosis (a T-score of -2 5 or less)   Severe osteoporosis (a T-score of -2 5 or less with a fragility fracture)             Workstation performed: [id number]       Signatures   Electronically signed by : KAY Blanco ; Dec 10 2016 10:15AM EST                       (Author)

## 2018-01-17 NOTE — RESULT NOTES
Message  HgA1C improved from last check  Verified Results  (Q) HEMOGLOBIN A1c [id number] 11:56AM Arnaldo Juarez   REPORT COMMENT:  FASTING:NO     Test Name Result Flag Reference   HEMOGLOBIN A1c 8 6 % of total Hgb H <5 7   According to ADA guidelines, hemoglobin A1c <7 0%  represents optimal control in non-pregnant diabetic  patients  Different metrics may apply to specific  patient populations  Standards of Medical Care in    Diabetes Care  2013;36:s11-s66     For the purpose of screening for the presence of  diabetes  <5 7%       Consistent with the absence of diabetes  5 7-6 4%    Consistent with increased risk for diabetes              (prediabetes)  >or=6 5%    Consistent with diabetes     This assay result is consistent with diabetes  mellitus  Currently, no consensus exists for use of hemoglobin  A1c for diagnosis of diabetes for children  * DXA BONE DENSITY SPINE HIP AND PELVIS 29Jun2016 01:09PM Destinee Izaguirre Order Number: [id number]     Test Name Result Flag Reference   DXA BONE DENSITY SPINE HIP AND PELVIS (Report)     CENTRAL DXA SCAN     CLINICAL HISTORY:  80year old post-menopausal  female risk factors include history of smoking  Degenerative arthritis and non-insulin-dependent diabetes  Osteoporosis screening  TECHNIQUE: Bone densitometry was performed using a Hologic Horizon A  bone densitometer  Regions of interest appear properly placed  There are no obvious fractures or other confounding variables which could limit the study  Degenerative changes of the   lumbar spine and hip  This will falsely elevate the bone mineral densities in these regions  COMPARISON: None  RESULTS:    LUMBAR SPINE: L1-L4:   BMD 1 0 67 gm/cm2   T-score 0 2   Z-score 2 3     LUMBAR SPINE L1, L2 and L4 (average) :          BMD 1 008 gm/sq-cm        T-score is -0 2         Z-score is 1 9          LEFT TOTAL HIP:   BMD 1 005 gm/cm2   T-score 0 5   Z-score 2 0     LEFT FEMORAL NECK:   BMD 0 653 gm/cm2   T-score -1 8   Z-score 0 0           ASSESSMENT:   1  Based on the WHO classification, the T-score of -1 8 in the left hip is consistent with low bone mineral density  2  The 10 year risk of hip fracture is 2 % with the 10 year risk of major osteoporotic fracture being 10 % as calculated by the WHO fracture risk assessment tool (FRAX)  The current NOF guidelines recommend treating patients with FRAX 10 year risk    score of >3% for hip fracture and >20% for major osteoporotic fracture  3  Any secondary causes of low bone mineral density should be excluded prior to treatment, if clinically indicated  4  A daily intake of at least 1200 mg calcium and 800 - 1000 IU of Vitamin D, as well as weight bearing and muscle strengthening exercise, fall prevention and avoidance of tobacco and excessive alcohol intake as basic preventive measures are suggested  WHO CLASSIFICATION:   Normal (a T-score of -1 0 or higher)   Low bone mineral density (a T-score of less than -1 0 but higher than -2 5)   Osteoporosis (a T-score of -2 5 or less)   Severe osteoporosis (a T-score of -2 5 or less with a fragility fracture)             Workstation performed: [id number]     [facility] SCREENING BILATERAL W CAD 29Jun2016 01:09PM Linakavita Dwyer    Order Number: [id number]     Test Name Result Flag Reference   MAMMO SCREENING BILATERAL W CAD (Report)     Patient History:   Patient is postmenopausal    Family history of unknown metastatic cancer in brother at age 61  Patient is an every day smoker, and has smoked for 60 years  Patient's BMI is 37 4  Reason for exam: screening (asymptomatic)  Mammo Screening Bilateral W CAD: June 29, 2016 - Check In #:    [de-identified]   Bilateral MLO and CC view(s) were taken   XCCL view(s) were taken   of the left breast      Technologist: RT Denise(TOMY)(M)   Prior study comparison: May 1, 2007, bilateral screening    mammogram, performed at [place] [address]  February 3, 2006, mammogram      The breast tissue is almost entirely fat  No new dominant soft    tissue mass, architectural distortion or suspicious    calcifications are noted  The skin and nipple structures are    within normal limits  Benign appearing calcifications are noted  Stable nodular density present in the left anterior breast        No mammographic evidence of malignancy  No    significant changes when compared with prior studies  ASSESSMENT: BiRad:2 - Benign     Recommendation:   Routine screening mammogram of both breasts in 1 year  A    reminder letter will be scheduled  Analyzed by CAD     8-10% of cancers will be missed on mammography  Management of a    palpable abnormality must be based on clinical grounds  Patients   will be notified of their results via letter from our facility  Accredited by [employer] of Radiology and FDA       Transcription Location: [facility] 98: [id number]     Risk Value(s):   Tyrer-Cuzick 10 Year: 3 128%, Tyrer-Cuzick Lifetime: 4 957%,    Myriad Table: 1 5%, MARYAM 5 Year: 1 7%, NCI Lifetime: 5 0%   Signed by:   Jorge Luis Denton MD   6/30/16       Signatures   Electronically signed by : KAY Villa ; Dec 10 2016 10:21AM EST                       (Author)

## 2018-01-17 NOTE — PROGRESS NOTES
Assessment    1  Encounter for screening mammogram for breast cancer (V76 12) (Z12 31)   2  Screening for colon cancer (V76 51) (Z12 11)   3  Medicare annual wellness visit, initial (V70 0) (Z00 00)   4  Need for tetanus booster (V03 7) (Z23)    Plan   Diabetes mellitus    · Zoster (Zostavax) (Zoster (Zostavax)); INJECT 0 5 ML Once Pharmacy to administer  vaccine after active infection on RUE has resolved  Diabetes mellitus, Encounter for screening mammogram for breast cancer    · * MAMMO SCREENING BILATERAL W CAD; Status:Hold For - Scheduling; Requested  for:11Mar2016;   Need for tetanus booster    · Prevnar 13 Intramuscular Suspension  Type 2 diabetes mellitus with hyperglycemia    · Gabapentin 300 MG Oral Capsule; Take 1-2 capsules TID PRN for pain    *1 - [facility] DIABETES SELF MANAGEMENT TRAINING OUTPATIENT Physician Referral  Consult  Status: Hold For - Scheduling  Requested for: [id number]  Ordered; For: Diabetes mellitus; Ordered By: Montserrat Garcia  Performed:   Due: 52ZGN5506; Last Updated By: Montserrat Garcia; 3/11/2016 3:43:10 PM  * DXA BONE DENSITY SPINE HIP AND PELVIS; Status:Hold For - Scheduling; Requested for:11Mar2016;   Perform:[facility]; QFN:[id number]; Last Updated By:Jovanna Chavarria; 3/11/2016 3:43:26 PM;Ordered; For:Diabetes mellitus, Health Maintenance; Ordered By:Jovanna Chavarria;      Discussion/Summary    23TF F here for Medicare wellness visit  Shared Decision Making Aid Given for Colonoscopy  Referral sheet reprinted  Mammogram ordered  Zostavax ordered  Pt to RTC annually for HM assessments  No interim self referrals  Impression: Initial Annual Wellness Visit, with preventive exam as well as age and risk appropriate counseling completed  Cardiovascular screening and counseling: EKG recommended, counseling was given on ways to improve cholesterol and counseling was given on tobacco cessation     Diabetes screening and counseling: screening not indicated and counseling was given on ways to improve physical activity  Colorectal cancer screening and counseling: the risks and benefits of screening were discussed and Colonoscopy ordered via GI  CEA elevated  Breast cancer screening and counseling: the risks and benefits of screening were discussed and screening mammogram due every 1 year(s)  Cervical cancer screening and counseling: the risks and benefits of screening were discussed and screening not indicated  Osteoporosis screening and counseling: the risks and benefits of screening were discussed and due for DXA axial skeleton  Abdominal aortic aneurysm screening and counseling: screening not indicated  Glaucoma screening and counseling: the risks and benefits of screening were discussed and ophthalmologist referral    HIV screening and counseling: the risks and benefits of screening were discussed, screening is current and HIV test in past was negative  Monogamous partner for many years  Immunizations: the patient declines the influenza vaccination, influenza vaccination is recommended annually, the risks and benefits of pneumococcal vaccination were discussed with the patient, pneumococcal vaccine due today, hepatitis B vaccination series is not indicated at this time due to the patient's low risk of antoine the disease, the risks and benefits of the hepatitis vaccination series were discussed with the patient, the risks and benefits of the Zostavax vaccine were discussed with the patient, Zostavax vaccine needed today, Zostavax ordered  , the risks and benefits of the Td vaccine were discussed with the patient, the patient declines the Td vaccine and Medicare will not pay  Diabetes Counseling: I certify that indivdual DSMT services are required  Tobacco Cessation: tobacco cessation counseling declined  Advance Directive Planning: not complete, Full Code at this time   Advice and education were given regarding increasing physical activity, seat belt use, sunscreen use and weight loss  She was referred to podiatry and a   Patient Discussion: plan discussed with the patient, plan discussed with the patient's family, follow-up visit needed in 1 month  Chief Complaint  Medicare Wellness Visit      History of Present Illness  70yo F presents for initial medicare wellness visit after a long period without preventative health visits  The patient is being seen for the initial annual wellness visit  Medicare Screening and Risk Factors   Once per lifetime medicare screening tests: AAA screening US has not yet been done  Medicare Screening Tests Risk Questions   Abdominal aortic aneurysm risk assessment: none indicated  Osteoporosis risk assessment: , female gender and over 48years of age  Drug and Alcohol Use: The patient currently smokes <1/2 ppd packs per day  She has smoked for 61 year(s)  She has declined tobacco cessation intervention  The patient reports never drinking alcohol  Alcohol concern:   The patient has no concerns about alcohol abuse  She has never used illicit drugs  Diet and Physical Activity: Current diet includes unhealthy food choices  She exercises infrequently  Exercise: walking  Functional Ability/Level of Safety: Hearing is normal bilaterally  Activities of daily living details: transportation help needed, needs help doing housework, needs help doing laundry, needs help managing medications, needs help managing money and illiterate  Fall risk factors:  polypharmacy, deconditioning and previous fall, but The patient fell 1 times in the past 12 months  Mechanical Fall  Home safety risk factors:  no loose rugs, no uneven floors and grab bars in the bathroom  Advance Directives: Advance directives: no living will, no durable power of  for health care directives and no advance directives  end of life decisions were reviewed with the patient and I agree with the patient's decisions   Concerns with the patient's end of life decisions: Full Code  Co-Managers and Medical Equipment/Suppliers: See Patient Care Team   Falls Risk: The patient fell 1 times in the past 12 months  Mechanical fall due to conditions as above  The patient is currently asymptomatic Symptoms Include:  Associated symptoms:  No associated symptoms are reported  Current treatment includes environmental modifications and nonsteroidal anti-inflammatory drugs  The patient currently has no urinary incontinence symptoms  Urinary Incontinence Symptoms includes: urinary frequency, but no urinary urgency, no urinary hesitancy and no dysuria    Date of last glaucoma screen was never      Review of Systems    Constitutional: no fever and no chills  Cardiovascular: no chest pain and no palpitations  Respiratory: no shortness of breath  Gastrointestinal: abdominal pain and LLQ Pan  Genitourinary: as noted in HPI and no dysuria  Integumentary and Breasts: no rashes  Neurological: no headache  Psychiatric: no depression  Yes, the patient is satisfied with Her life  No, the patient has not dropped any activities or interests  No, the patient does not feel that their life is empty  Yes, the patient often gets bored  Point = 1  Yes, the patient is hopeful about the future  No, the patient is not bothered by thoughts in their head  Yes, the patient is in good spirits most of the time  No, the patient is not afraid something bad is going to happen to them  Yes, the patient feels happy most of the time  No, the patient does not often feel helpless  No, the patient reyes not often get restless and fidgety  No, the patient likes to go out and try new things  No, the patient does not worry about the future  No, the patient does not feel that they have more problems with their memory than most    Yes, the patient thinks its wonderful to be alive now  No, the patient does not feel downhearted or blue     No, the patient does not feel worthless the way they are currently  Yes, the patient finds that life is very exciting  No, the patient does not worry a lot about the past    No, it is not hard for the patient to get started on new projects  Yes, the patient feels full of energy  No, the patient does not feel their situation is hopeless  No, the patient does not feel that most people are better off then they are  No, the patient does not frequently get upset about the little things  No, the patient does not frequently feel like crying  No, the patient does not have any problems concentrating  Yes, the patient enjoys getting up in the morning  No, the patient enjoys social gatherings  Yes, the patient finds it easy to make decisions  Yes, the patient feels their mind is as clear as it used to be  Score 1   Normal 0 - 9, Mild Depression 10 - 19, Severe Depression 20 - 30      Active Problems     1  Amenorrhea (626 0) (N91 2)   2  Elevated serum creatinine (790 99) (R79 89)   3  Elevated tumor markers (795 89) (R97 8)   4  Encounter for screening mammogram for breast cancer (V76 12) (Z12 31)   5  Gout (274 9) (M10 9)   6  Menopause (627 2) (Z78 0)   7  Non morbid obesity due to excess calories (278 00) (E66 09)   8  Screening for colon cancer (V76 51) (Z12 11)    Abdominal enlargement (789 30) (R19 8)       Type 2 diabetes mellitus with hyperglycemia (250 00) (E11 65)       Hospital discharge follow-up (V67 59) (Z09)       Kidney lesion (593 9) (N28 9)          Past Medical History    1  History of Normal vaginal delivery (650) (O80)    The active problems and past medical history were reviewed and updated today  Surgical History    1  History of Appendectomy    The surgical history was reviewed and updated today  Family History    1  Family history of diabetes mellitus (V18 0) (Z83 3)    2  Family history of diabetes mellitus (V18 0) (Z83 3)    The family history was reviewed and updated today         Social History    · Never smoker   · No alcohol use   · No drug use  The social history was reviewed and is unchanged  Current Meds   1  Allopurinol 100 MG Oral Tablet; TAKE 1 TABLET DAILY AS DIRECTED  Requested for:   77Tag0558; Last Rx:30Uut7037 Ordered   2  Aspirin 81 MG Oral Tablet; TAKE 1 TABLET DAILY; Therapy: 53NUF3190 to (Evaluate:25Apr2016)  Requested for: 84OHR1007; Last   Rx:71Sjc2632 Ordered   3  BD Pen Needle Mini U/F 31G X 5 MM Miscellaneous; use daily with lantus pen    Requested for: 23Feb2016; Last Rx:23Feb2016 Ordered   4  Calcium 500 MG Oral Tablet; TAKE 1 TABLET DAILY  Requested for: 23Feb2016; Last   Rx:23Feb2016 Ordered   5  Gabapentin 300 MG Oral Capsule; Take 1 capsule twice daily; Therapy: 63QHT7940 to (Evaluate:23Mar2016)  Requested for: 23Feb2016; Last   Rx:52Luq6868 Ordered   6  Gemfibrozil 600 MG Oral Tablet; Therapy: 29NWH8842 to Recorded   7  Glimepiride 2 MG Oral Tablet; TAKE 1 TABLET DAILY WITH BREAKFAST; Last   Rx:23Feb2016 Ordered   8  Januvia 50 MG Oral Tablet; take 1 tablet by mouth once daily; Last Rx:23Feb2016   Ordered   9  Lantus SoloStar 100 UNIT/ML Subcutaneous Solution Pen-injector; INJECT 20 UNIT   Bedtime; Therapy: 27Wis0975 to (Evaluate:74Nez0333)  Requested for: 23Feb2016; Last   Rx:23Feb2016 Ordered   10  Lisinopril 10 MG Oral Tablet; take 1 tablet by mouth every day; Therapy: 44DYE1841 to ([facility])  Requested for: 23Feb2016; Last    Rx:23Feb2016 Ordered   11  Pantoprazole Sodium 40 MG Oral Tablet Delayed Release; Take 1 tablet daily     Requested for: 84Xho5006; Last Rx:62Bnz7885 Ordered   12  Welchol 625 MG Oral Tablet; TAKE 3 TABLETS DAILY  Requested for: 22Iev1567; Last    Rx:75Clj8372 Ordered    The medication list was reviewed and updated today  Allergies    1   No Known Drug Allergies    Vitals  Signs [Data Includes: Current Encounter]   Recorded: 76KOZ3771 12:53PM   Temperature: 98 3 F  Heart Rate: 76  Respiration: 16  Systolic: 013  Diastolic: 80  BP Cuff Size: Large  Height: 5 ft 1 2 in  Weight: 197 lb 3 2 oz  BMI Calculated: 37 02  BSA Calculated: 1 88  Pain Scale: 0    Attending Note  Attending Note [facility]: Level of Participation: I was present in clinic, but did not examine the patient  Diagnosis and Plan: AMWV as per note by Dr Sheryl Velasco  I agree with the Resident's note  Future Appointments    Date/Time Provider Specialty Site   03/16/2016 08:20 AM Joanne Lackey DO Gastroenterology Adult [facility] GASTROENTEROLOGY SPECIAL   04/15/2016 01:00 PM Deon, [address] [place] [address]     Signatures   Electronically signed by : Dorie Goltz, ; Mar 14 2016  9:19PM EST                       (Author)    Electronically signed by : Dorie Goltz, ; Mar 15 2016  2:38AM EST                       (Author)    Electronically signed by :  Batsheva Nava MD; Mar 15 2016  7:52AM EST                       (Author)

## 2018-01-17 NOTE — MISCELLANEOUS
Provider Comments  Provider Comments:   Dear Brooklyn Powers,    You missed your appointment with Cesario Forbes on 07/13/2016  Please contact our office to reschedule at [phone]        Thank You,  [facility]      Signatures   Electronically signed by : Jin Honeycutt, ; Jul 13 2016 10:45AM EST                       (Author)    Electronically signed by : Sammy Combs DO; Jul 13 2016 11:15AM EST                       (Author)

## 2018-01-17 NOTE — MISCELLANEOUS
Provider Comments  Provider Comments:   Dear Leisa Win,     You have missed an appointment with Dr Rodrigue Donovan on 03/16/2016  Please call our office at [phone] to reschedule      Thank you,  [facility]      Signatures   Electronically signed by : Daria Hicks, ; Mar 16 2016 10:37AM EST                       (Author)    Electronically signed by : Hermelinda Montanez DO; Mar 20 2016  8:30PM EST                       (Author)

## 2018-01-18 NOTE — RESULT NOTES
Verified Results  (Q) HEMOGLOBIN A1c [id number] 11:56AM Arnaldo Juarez   REPORT COMMENT:  FASTING:NO     Test Name Result Flag Reference   HEMOGLOBIN A1c 8 6 % of total Hgb H <5 7   According to ADA guidelines, hemoglobin A1c <7 0%  represents optimal control in non-pregnant diabetic  patients  Different metrics may apply to specific  patient populations  Standards of Medical Care in    Diabetes Care  2013;36:s11-s66     For the purpose of screening for the presence of  diabetes  <5 7%       Consistent with the absence of diabetes  5 7-6 4%    Consistent with increased risk for diabetes              (prediabetes)  >or=6 5%    Consistent with diabetes     This assay result is consistent with diabetes  mellitus  Currently, no consensus exists for use of hemoglobin  A1c for diagnosis of diabetes for children       (Q) CBC (H/H, RBC, INDICES, WBC, PLT) [id number] 11:56AM Kaley Juarez     Test Name Result Flag Reference   WHITE BLOOD CELL COUNT 6 5 Thousand/uL  3 8-10 8   RED BLOOD CELL COUNT 3 60 Million/uL L 3 80-5 10   HEMOGLOBIN 11 9 g/dL  11 7-15 5   HEMATOCRIT 34 6 % L 35 0-45 0   MCV 96 2 fL  80 0-100 0   MCH 32 9 pg  27 0-33 0   MCHC 34 2 g/dL  32 0-36 0   RDW 12 9 %  11 0-15 0   PLATELET COUNT 025 Thousand/uL  140-400   MPV 9 1 fL  7 5-11 5       Signatures   Electronically signed by : KAY Ly ; Dec 13 2016 12:46PM EST                       (Author)

## 2018-01-22 ENCOUNTER — ANESTHESIA (OUTPATIENT)
Dept: PERIOP | Facility: AMBULARY SURGERY CENTER | Age: 73
End: 2018-01-22
Payer: COMMERCIAL

## 2018-01-22 ENCOUNTER — GENERIC CONVERSION - ENCOUNTER (OUTPATIENT)
Dept: OTHER | Facility: OTHER | Age: 73
End: 2018-01-22

## 2018-01-22 ENCOUNTER — HOSPITAL ENCOUNTER (OUTPATIENT)
Facility: AMBULARY SURGERY CENTER | Age: 73
Setting detail: OUTPATIENT SURGERY
Discharge: HOME | End: 2018-01-22
Attending: INTERNAL MEDICINE | Admitting: INTERNAL MEDICINE
Payer: COMMERCIAL

## 2018-01-22 VITALS
BODY MASS INDEX: 37.9 KG/M2 | RESPIRATION RATE: 16 BRPM | WEIGHT: 188 LBS | TEMPERATURE: 97.3 F | HEART RATE: 62 BPM | HEIGHT: 59 IN | DIASTOLIC BLOOD PRESSURE: 60 MMHG | SYSTOLIC BLOOD PRESSURE: 140 MMHG | OXYGEN SATURATION: 98 %

## 2018-01-22 DIAGNOSIS — R10.13 EPIGASTRIC PAIN: ICD-10-CM

## 2018-01-22 DIAGNOSIS — R19.4 CHANGE IN BOWEL HABITS: ICD-10-CM

## 2018-01-22 LAB — GLUCOSE SERPL-MCNC: 150 MG/DL (ref 65–140)

## 2018-01-22 PROCEDURE — 88342 IMHCHEM/IMCYTCHM 1ST ANTB: CPT | Performed by: INTERNAL MEDICINE

## 2018-01-22 PROCEDURE — 88305 TISSUE EXAM BY PATHOLOGIST: CPT | Performed by: INTERNAL MEDICINE

## 2018-01-22 PROCEDURE — 82948 REAGENT STRIP/BLOOD GLUCOSE: CPT

## 2018-01-22 RX ORDER — ALLOPURINOL 100 MG/1
100 TABLET ORAL DAILY
COMMUNITY
End: 2018-02-28 | Stop reason: SDUPTHER

## 2018-01-22 RX ORDER — ATORVASTATIN CALCIUM 40 MG/1
40 TABLET, FILM COATED ORAL DAILY
COMMUNITY
End: 2019-08-27 | Stop reason: ALTCHOICE

## 2018-01-22 RX ORDER — PROPOFOL 10 MG/ML
INJECTION, EMULSION INTRAVENOUS CONTINUOUS PRN
Status: DISCONTINUED | OUTPATIENT
Start: 2018-01-22 | End: 2018-01-22 | Stop reason: SURG

## 2018-01-22 RX ORDER — POLYETHYLENE GLYCOL 3350, SODIUM SULFATE ANHYDROUS, SODIUM BICARBONATE, SODIUM CHLORIDE, POTASSIUM CHLORIDE 236; 22.74; 6.74; 5.86; 2.97 G/4L; G/4L; G/4L; G/4L; G/4L
POWDER, FOR SOLUTION ORAL
COMMUNITY
End: 2018-07-31 | Stop reason: ALTCHOICE

## 2018-01-22 RX ORDER — PANTOPRAZOLE SODIUM 40 MG/1
40 TABLET, DELAYED RELEASE ORAL DAILY
COMMUNITY
End: 2018-02-28 | Stop reason: SDUPTHER

## 2018-01-22 RX ORDER — METOPROLOL SUCCINATE 50 MG/1
25 TABLET, EXTENDED RELEASE ORAL DAILY
COMMUNITY
End: 2019-08-27 | Stop reason: ALTCHOICE

## 2018-01-22 RX ORDER — AMLODIPINE BESYLATE 5 MG/1
5 TABLET ORAL DAILY
COMMUNITY
End: 2019-02-04 | Stop reason: ALTCHOICE

## 2018-01-22 RX ORDER — GLIMEPIRIDE 2 MG/1
2 TABLET ORAL
COMMUNITY
End: 2018-01-30 | Stop reason: SDUPTHER

## 2018-01-22 RX ORDER — CHOLESTYRAMINE 4 G/9G
1 POWDER, FOR SUSPENSION ORAL 3 TIMES DAILY PRN
Qty: 60 EACH | Refills: 0 | Status: SHIPPED | OUTPATIENT
Start: 2018-01-22 | End: 2018-07-31 | Stop reason: ALTCHOICE

## 2018-01-22 RX ORDER — LEVOCETIRIZINE DIHYDROCHLORIDE 5 MG/1
5 TABLET, FILM COATED ORAL EVERY EVENING
COMMUNITY
End: 2018-04-27 | Stop reason: SDUPTHER

## 2018-01-22 RX ORDER — COLESEVELAM 180 1/1
1875 TABLET ORAL 2 TIMES DAILY WITH MEALS
COMMUNITY
End: 2018-07-31 | Stop reason: ALTCHOICE

## 2018-01-22 RX ORDER — SODIUM CHLORIDE 9 MG/ML
100 INJECTION, SOLUTION INTRAVENOUS CONTINUOUS
Status: DISCONTINUED | OUTPATIENT
Start: 2018-01-22 | End: 2018-01-22 | Stop reason: HOSPADM

## 2018-01-22 RX ORDER — PROPOFOL 10 MG/ML
INJECTION, EMULSION INTRAVENOUS AS NEEDED
Status: DISCONTINUED | OUTPATIENT
Start: 2018-01-22 | End: 2018-01-22 | Stop reason: SURG

## 2018-01-22 RX ORDER — GABAPENTIN 100 MG/1
100 CAPSULE ORAL 3 TIMES DAILY
COMMUNITY
End: 2018-07-31 | Stop reason: ALTCHOICE

## 2018-01-22 RX ORDER — LISINOPRIL 10 MG/1
10 TABLET ORAL DAILY
COMMUNITY
End: 2018-01-30 | Stop reason: SDUPTHER

## 2018-01-22 RX ORDER — INSULIN GLARGINE 100 [IU]/ML
INJECTION, SOLUTION SUBCUTANEOUS
COMMUNITY
End: 2018-07-31 | Stop reason: ALTCHOICE

## 2018-01-22 RX ORDER — ASPIRIN 81 MG/1
81 TABLET ORAL DAILY
COMMUNITY
End: 2018-04-10 | Stop reason: SDUPTHER

## 2018-01-22 RX ADMIN — PROPOFOL 30 MG: 10 INJECTION, EMULSION INTRAVENOUS at 08:28

## 2018-01-22 RX ADMIN — PROPOFOL 50 MG: 10 INJECTION, EMULSION INTRAVENOUS at 08:26

## 2018-01-22 RX ADMIN — SODIUM CHLORIDE: 0.9 INJECTION, SOLUTION INTRAVENOUS at 08:00

## 2018-01-22 RX ADMIN — PROPOFOL 100 MCG/KG/MIN: 10 INJECTION, EMULSION INTRAVENOUS at 08:26

## 2018-01-22 NOTE — OP NOTE
ESOPHAGOGASTRODUODENOSCOPY    PROCEDURE: EGD/ Biopsy    INDICATIONS: Abdominal pain, Epigastric    POST-OP DIAGNOSIS: See the impression below    SEDATION: Monitored anesthesia care, check anesthesia records    PHYSICAL EXAM:    Vitals:    01/22/18 0729   BP: 140/63   Pulse: 67   Resp: 18   Temp: 97 5 °F (36 4 °C)   SpO2: 98%    Body mass index is 37 97 kg/m²  General: NAD  Heart: S1 & S2 normal, RRR  Lungs: CTA, No rales or rhonchi  Abdomen: Soft, nontender, nondistended, good bowel sounds    CONSENT:  Informed consent was obtained for the procedure, including sedation after explaining the risks and benefits of the procedure  Risks including but not limited to bleeding, perforation, infection, aspiration were discussed in detail  Also explained about less than 100% sensitivity with the exam and other alternatives  PREPARATION:   EKG tracing, pulse oximetry, blood pressure were monitored throughout the procedure  Patient was identified by myself both verbally and by visual inspection of ID band  DESCRIPTION:   Patient was placed in the left lateral decubitus position and was sedated with the above medication  The gastroscope was introduced in to the oropharynx and the esophagus was intubated under direct visualization  Scope was passed down the esophagus up to 2nd part of the duodenum  A careful inspection was made as the gastroscope was withdrawn, including a retroflexed view of the stomach; findings and interventions are described below  FINDINGS:    #1  Esophagus and GEJ-small sliding hiatal hernia was noted  #2  Stomach-normal mucosa  Biopsies done to check for H pylori    #3  Duodenum-normal         IMPRESSIONS:      As above    RECOMMENDATIONS:     Check pathology    Continue Protonix    COMPLICATIONS:  None; patient tolerated the procedure well            DISPOSITION: PACU           CONDITION: Stable

## 2018-01-22 NOTE — ANESTHESIA PREPROCEDURE EVALUATION
Review of Systems/Medical History  Patient summary reviewed  Chart reviewed      Cardiovascular  Exercise tolerance: good,  Hypertension controlled,    Pulmonary  Smoker cigarette smoker  , Tobacco cessation counseling given ,        GI/Hepatic    Bowel prep            Endo/Other  Diabetes well controlled type 2 Oral agent,   Obesity  morbid obesity   GYN  Negative gynecology ROS          Hematology  Negative hematology ROS      Musculoskeletal  Back pain , spinal stenosis,        Neurology  Negative neurology ROS      Psychology   Negative psychology ROS              Physical Exam    Airway    Mallampati score: I  TM Distance: >3 FB  Neck ROM: full     Dental   lower dentures and upper dentures,     Cardiovascular  Rhythm: regular, Rate: normal,     Pulmonary  Breath sounds clear to auscultation,     Other Findings        Anesthesia Plan  ASA Score- 3     Anesthesia Type- IV sedation with anesthesia with ASA Monitors  Additional Monitors:   Airway Plan:         Plan Factors- Patient instructed to abstain from smoking on day of procedure  Patient did not smoke on day of surgery  Induction- intravenous  Postoperative Plan-     Informed Consent- Anesthetic plan and risks discussed with patient  I personally reviewed this patient with the CRNA  Discussed and agreed on the Anesthesia Plan with the CRNA  Dara Soulier

## 2018-01-22 NOTE — H&P
History and Physical - [facility]  Rosie Christiansencaridad 71 y o  female MRN: [id number]        HPI: Violette Isabel came in for regular follow-up  She was seen last year with symptoms of epigastric pain and chronic diarrhea  She was scheduled for EGD and colonoscopy at that time but she never came back because of ride issues  She denies any abdominal pain, nausea or vomiting  She takes Protonix regularly with help  She reports having episodes of loose to soft bowel movements, denies any blood or mucus in the stool  Good appetite, no recent weight loss  Her sister had colon cancer  Historical Information   Past Medical History:   Diagnosis Date    Abdominal enlargement     Amenorrhea     Back pain     Chronic diarrhea     Diabetes mellitus (HCC)     GI bleeding     Gout     Hypercholesteremia     Hypertension     Kidney lesion     Obesity      Past Surgical History:   Procedure Laterality Date    APPENDECTOMY       Social History   History   Alcohol Use    Yes     Comment: rarely     History   Drug Use No     History   Smoking Status    Current Every Day Smoker   Smokeless Tobacco    Never Used     History reviewed  No pertinent family history      Meds/Allergies     Prescriptions Prior to Admission   Medication    allopurinol (ZYLOPRIM) 100 mg tablet    amLODIPine (NORVASC) 5 mg tablet    aspirin (ECOTRIN LOW STRENGTH) 81 mg EC tablet    atorvastatin (LIPITOR) 40 mg tablet    bisacodyl (DULCOLAX) 5 mg EC tablet    Calcium-Magnesium-Vitamin D (CALCIUM 500 PO)    colesevelam (WELCHOL) 625 mg tablet    gabapentin (NEURONTIN) 100 mg capsule    glimepiride (AMARYL) 2 mg tablet    insulin glargine (LANTUS) 100 units/mL subcutaneous injection    Insulin Pen Needle (B-D UF III MINI PEN NEEDLES) 31G X 5 MM MISC    levocetirizine (XYZAL) 5 MG tablet    lisinopril (ZESTRIL) 10 mg tablet    metFORMIN (GLUCOPHAGE) 500 mg tablet    metoprolol succinate (TOPROL-XL) 50 mg 24 hr tablet    pantoprazole (PROTONIX) 40 mg tablet    PEG 3350-KCl-NaBcb-NaCl-NaSulf (PEG-3350/ELECTROLYTES) 236 g SOLR    sitaGLIPtin (JANUVIA) 100 mg tablet       No Known Allergies    Objective     Blood pressure 140/63, pulse 67, temperature 97 5 °F (36 4 °C), temperature source Temporal, resp  rate 18, height 4' 11" (1 499 m), weight 85 3 kg (188 lb), SpO2 98 %      PHYSICAL EXAM:    Gen: NAD  CV: S1 & S2 normal, RRR  CHEST: Clear to auscultate  ABD: soft, NT/ND, good bowel sounds  EXT: no edema    ASSESSMENT:     Epigastric pain, diarrhea, family history of colon cancer in sister    PLAN:    EGD and colonoscopy

## 2018-01-22 NOTE — OP NOTE
COLONOSCOPY    PROCEDURE: Colonoscopy/ Biopsy  Polypectomy (Cold Snare)  Polypectomy (Snare Cautery)    INDICATIONS: Diarrhea, Chronic/ family history of colon cancer in sister    POST-OP DIAGNOSIS: See the impression below    SEDATION: Monitored anesthesia care, check anesthesia records    PHYSICAL EXAM:    /63   Pulse 67   Temp 97 5 °F (36 4 °C) (Temporal)   Resp 18   Ht 4' 11" (1 499 m)   Wt 85 3 kg (188 lb)   SpO2 98%   BMI 37 97 kg/m²   Body mass index is 37 97 kg/m²  General: NAD  Heart: S1 & S2 normal, RRR  Lungs: CTA, No rales or rhonchi  Abdomen: Soft, nontender, nondistended, good bowel sounds    CONSENT:  Informed consent was obtained for the procedure, including sedation after explaining the risks and benefits of the procedure  Risks including but not limited to bleeding, perforation, infection, aspiration were discussed in detail  Also explained about less than 100%$ sensitivity with the exam and other alternatives  PREPARATION:   EKG tracing, pulse oximetry, blood pressure were monitored throughout the procedure  Patient was identified by myself both verbally and by visual inspection of ID band  DESCRIPTION:   Patient was placed in the left lateral decubitus position and was sedated with the above medication  Digital rectal examination was performed  The colonoscope was introduced in to the anal canal and advanced up to cecum, which was identified by the appendiceal orifice and IC valve  A careful inspection was made as the colonoscope was withdrawn, including a retroflexed view of the rectum; findings and interventions are described below  Appropriate photodocumentation was obtained  The quality of the colonic preparation was fair  FINDINGS:    1  Cecum and ileocecal valve-normal mucosa    2  Liquid brown stool was washed off and suctioned throughout    3  Descending colon-there are 2 polyps measuring 6 mm and 7 mm were removed with snare cautery    4    Sigmoid colon-in the distal part 8 mm polyp was removed with snare cautery    5  Rectum and anal canal -2 polyps in the distal rectum measuring 5 mm and 4 mm were removed with snare cautery and cold snare    6  Status post random colon biopsies to rule out microscopic colitis         IMPRESSIONS:      As above    RECOMMENDATIONS:    Check pathology    May use Questran as needed    COMPLICATIONS:  None; patient tolerated the procedure well      DISPOSITION: PACU           CONDITION: Stable

## 2018-01-22 NOTE — ANESTHESIA POSTPROCEDURE EVALUATION
Post-Op Assessment Note      CV Status:  Stable    Mental Status:  Awake    Hydration Status:  Euvolemic    PONV Controlled:  Controlled    Airway Patency:  Patent    Post Op Vitals Reviewed: Yes          Staff: CRNA           BP   131/63   Temp      Pulse  68   Resp   22   SpO2   97 RA

## 2018-01-23 VITALS
RESPIRATION RATE: 18 BRPM | HEART RATE: 83 BPM | HEIGHT: 60 IN | WEIGHT: 195 LBS | TEMPERATURE: 97.5 F | OXYGEN SATURATION: 98 % | DIASTOLIC BLOOD PRESSURE: 88 MMHG | BODY MASS INDEX: 38.28 KG/M2 | SYSTOLIC BLOOD PRESSURE: 130 MMHG

## 2018-01-30 DIAGNOSIS — E11.8 TYPE 2 DIABETES MELLITUS WITH COMPLICATION, UNSPECIFIED LONG TERM INSULIN USE STATUS: ICD-10-CM

## 2018-01-30 DIAGNOSIS — I10 HYPERTENSION, UNSPECIFIED TYPE: ICD-10-CM

## 2018-01-30 DIAGNOSIS — I10 HYPERTENSION, UNSPECIFIED TYPE: Primary | ICD-10-CM

## 2018-01-30 RX ORDER — LISINOPRIL 10 MG/1
TABLET ORAL
Qty: 30 TABLET | Refills: 2 | Status: SHIPPED | OUTPATIENT
Start: 2018-01-30 | End: 2018-09-30 | Stop reason: SDUPTHER

## 2018-01-30 RX ORDER — LISINOPRIL 10 MG/1
TABLET ORAL
Qty: 30 TABLET | Refills: 2 | Status: SHIPPED | OUTPATIENT
Start: 2018-01-30 | End: 2018-01-30 | Stop reason: SDUPTHER

## 2018-01-30 RX ORDER — GLIMEPIRIDE 2 MG/1
TABLET ORAL
Qty: 30 TABLET | Refills: 2 | Status: SHIPPED | OUTPATIENT
Start: 2018-01-30 | End: 2018-01-30 | Stop reason: SDUPTHER

## 2018-01-30 RX ORDER — GLIMEPIRIDE 2 MG/1
TABLET ORAL
Qty: 90 TABLET | Refills: 0 | Status: SHIPPED | OUTPATIENT
Start: 2018-01-30 | End: 2018-07-31 | Stop reason: ALTCHOICE

## 2018-01-31 DIAGNOSIS — R52 PAIN: Primary | ICD-10-CM

## 2018-02-01 RX ORDER — LISINOPRIL 10 MG/1
TABLET ORAL
Qty: 90 TABLET | Refills: 0 | Status: SHIPPED | OUTPATIENT
Start: 2018-02-01 | End: 2018-04-18 | Stop reason: SDUPTHER

## 2018-02-01 RX ORDER — GABAPENTIN 300 MG/1
CAPSULE ORAL
Qty: 180 CAPSULE | Refills: 2 | Status: SHIPPED | OUTPATIENT
Start: 2018-02-01 | End: 2018-04-27 | Stop reason: SDUPTHER

## 2018-02-28 ENCOUNTER — TELEPHONE (OUTPATIENT)
Dept: FAMILY MEDICINE CLINIC | Facility: CLINIC | Age: 73
End: 2018-02-28

## 2018-02-28 DIAGNOSIS — K21.9 GASTROESOPHAGEAL REFLUX DISEASE, ESOPHAGITIS PRESENCE NOT SPECIFIED: ICD-10-CM

## 2018-02-28 DIAGNOSIS — M1A.9XX0 CHRONIC GOUT WITHOUT TOPHUS, UNSPECIFIED CAUSE, UNSPECIFIED SITE: ICD-10-CM

## 2018-02-28 DIAGNOSIS — K21.9 GASTROESOPHAGEAL REFLUX DISEASE, ESOPHAGITIS PRESENCE NOT SPECIFIED: Primary | ICD-10-CM

## 2018-02-28 RX ORDER — PANTOPRAZOLE SODIUM 40 MG/1
40 TABLET, DELAYED RELEASE ORAL DAILY
Qty: 90 TABLET | Refills: 0 | Status: SHIPPED | OUTPATIENT
Start: 2018-02-28 | End: 2018-04-02 | Stop reason: SDUPTHER

## 2018-02-28 RX ORDER — PANTOPRAZOLE SODIUM 40 MG/1
TABLET, DELAYED RELEASE ORAL
Qty: 30 TABLET | Refills: 2 | Status: SHIPPED | OUTPATIENT
Start: 2018-02-28 | End: 2018-02-28 | Stop reason: SDUPTHER

## 2018-02-28 RX ORDER — ALLOPURINOL 100 MG/1
TABLET ORAL
Qty: 30 TABLET | Refills: 2 | Status: SHIPPED | OUTPATIENT
Start: 2018-02-28 | End: 2018-02-28 | Stop reason: SDUPTHER

## 2018-02-28 RX ORDER — ALLOPURINOL 100 MG/1
100 TABLET ORAL DAILY
Qty: 90 TABLET | Refills: 0 | Status: SHIPPED | OUTPATIENT
Start: 2018-02-28 | End: 2018-04-02 | Stop reason: SDUPTHER

## 2018-02-28 NOTE — TELEPHONE ENCOUNTER
Voicemail:    Pharmacy requesting 90 day supply of medications that were sent in earlier today  Thank you

## 2018-03-09 PROBLEM — I10 HYPERTENSION: Status: ACTIVE | Noted: 2018-03-09

## 2018-03-09 PROBLEM — E11.9 DIABETES MELLITUS (HCC): Status: ACTIVE | Noted: 2018-03-09

## 2018-03-09 PROBLEM — E78.00 HYPERCHOLESTEREMIA: Status: ACTIVE | Noted: 2018-03-09

## 2018-04-02 ENCOUNTER — TELEPHONE (OUTPATIENT)
Dept: FAMILY MEDICINE CLINIC | Facility: CLINIC | Age: 73
End: 2018-04-02

## 2018-04-02 DIAGNOSIS — E11.8 TYPE 2 DIABETES MELLITUS WITH COMPLICATION, UNSPECIFIED LONG TERM INSULIN USE STATUS: ICD-10-CM

## 2018-04-02 DIAGNOSIS — Z00.00 PREVENTATIVE HEALTH CARE: Primary | ICD-10-CM

## 2018-04-02 DIAGNOSIS — M1A.9XX0 CHRONIC GOUT WITHOUT TOPHUS, UNSPECIFIED CAUSE, UNSPECIFIED SITE: ICD-10-CM

## 2018-04-02 DIAGNOSIS — K21.9 GASTROESOPHAGEAL REFLUX DISEASE, ESOPHAGITIS PRESENCE NOT SPECIFIED: ICD-10-CM

## 2018-04-02 RX ORDER — ALLOPURINOL 100 MG/1
TABLET ORAL
Qty: 90 TABLET | Refills: 0 | Status: SHIPPED | OUTPATIENT
Start: 2018-04-02 | End: 2018-04-05 | Stop reason: SDUPTHER

## 2018-04-02 RX ORDER — PANTOPRAZOLE SODIUM 40 MG/1
TABLET, DELAYED RELEASE ORAL
Qty: 90 TABLET | Refills: 0 | Status: SHIPPED | OUTPATIENT
Start: 2018-04-02 | End: 2018-04-05 | Stop reason: SDUPTHER

## 2018-04-05 DIAGNOSIS — K21.9 GASTROESOPHAGEAL REFLUX DISEASE, ESOPHAGITIS PRESENCE NOT SPECIFIED: ICD-10-CM

## 2018-04-05 DIAGNOSIS — M1A.9XX0 CHRONIC GOUT WITHOUT TOPHUS, UNSPECIFIED CAUSE, UNSPECIFIED SITE: ICD-10-CM

## 2018-04-05 DIAGNOSIS — E11.8 TYPE 2 DIABETES MELLITUS WITH COMPLICATION, UNSPECIFIED LONG TERM INSULIN USE STATUS: Primary | ICD-10-CM

## 2018-04-05 RX ORDER — ALLOPURINOL 100 MG/1
100 TABLET ORAL DAILY
Qty: 90 TABLET | Refills: 0 | Status: SHIPPED | OUTPATIENT
Start: 2018-04-05 | End: 2018-07-31 | Stop reason: ALTCHOICE

## 2018-04-05 RX ORDER — PANTOPRAZOLE SODIUM 40 MG/1
40 TABLET, DELAYED RELEASE ORAL DAILY
Qty: 90 TABLET | Refills: 0 | Status: SHIPPED | OUTPATIENT
Start: 2018-04-05 | End: 2018-04-18 | Stop reason: SDUPTHER

## 2018-04-06 DIAGNOSIS — E11.8 TYPE 2 DIABETES MELLITUS WITH COMPLICATION, UNSPECIFIED LONG TERM INSULIN USE STATUS: ICD-10-CM

## 2018-04-10 RX ORDER — ASPIRIN 81 MG
TABLET, DELAYED RELEASE (ENTERIC COATED) ORAL
Qty: 30 TABLET | Refills: 4 | Status: SHIPPED | OUTPATIENT
Start: 2018-04-10 | End: 2018-09-28 | Stop reason: SDUPTHER

## 2018-04-18 ENCOUNTER — OFFICE VISIT (OUTPATIENT)
Dept: FAMILY MEDICINE CLINIC | Facility: CLINIC | Age: 73
End: 2018-04-18
Payer: COMMERCIAL

## 2018-04-18 VITALS
TEMPERATURE: 97.6 F | DIASTOLIC BLOOD PRESSURE: 60 MMHG | HEART RATE: 68 BPM | SYSTOLIC BLOOD PRESSURE: 98 MMHG | RESPIRATION RATE: 16 BRPM

## 2018-04-18 DIAGNOSIS — E11.8 TYPE 2 DIABETES MELLITUS WITH COMPLICATION, WITHOUT LONG-TERM CURRENT USE OF INSULIN (HCC): ICD-10-CM

## 2018-04-18 DIAGNOSIS — K21.9 GASTROESOPHAGEAL REFLUX DISEASE, ESOPHAGITIS PRESENCE NOT SPECIFIED: ICD-10-CM

## 2018-04-18 DIAGNOSIS — K52.9 CHRONIC DIARRHEA: Primary | ICD-10-CM

## 2018-04-18 LAB — SL AMB POCT HEMOGLOBIN AIC: 6.3

## 2018-04-18 PROCEDURE — 99213 OFFICE O/P EST LOW 20 MIN: CPT | Performed by: FAMILY MEDICINE

## 2018-04-18 PROCEDURE — 83036 HEMOGLOBIN GLYCOSYLATED A1C: CPT | Performed by: FAMILY MEDICINE

## 2018-04-18 RX ORDER — SODIUM BICARBONATE 650 MG/1
650 TABLET ORAL 2 TIMES DAILY
COMMUNITY
End: 2018-07-31 | Stop reason: ALTCHOICE

## 2018-04-18 RX ORDER — LOPERAMIDE HYDROCHLORIDE 2 MG/1
2 CAPSULE ORAL AS NEEDED
Qty: 30 CAPSULE | Refills: 0 | Status: SHIPPED | OUTPATIENT
Start: 2018-04-18 | End: 2018-07-31 | Stop reason: ALTCHOICE

## 2018-04-18 RX ORDER — PANTOPRAZOLE SODIUM 20 MG/1
20 TABLET, DELAYED RELEASE ORAL DAILY
Qty: 15 TABLET | Refills: 0 | Status: SHIPPED | OUTPATIENT
Start: 2018-04-18 | End: 2019-02-04 | Stop reason: ALTCHOICE

## 2018-04-18 RX ORDER — LOPERAMIDE HYDROCHLORIDE 2 MG/1
2 CAPSULE ORAL 4 TIMES DAILY PRN
Qty: 30 CAPSULE | Refills: 0 | Status: SHIPPED | OUTPATIENT
Start: 2018-04-18 | End: 2018-04-18 | Stop reason: SDUPTHER

## 2018-04-18 RX ORDER — LOPERAMIDE HYDROCHLORIDE 2 MG/1
2 CAPSULE ORAL 4 TIMES DAILY PRN
COMMUNITY
End: 2018-04-18 | Stop reason: SDUPTHER

## 2018-04-18 NOTE — ASSESSMENT & PLAN NOTE
-  Patient used to take 36 units Lantus at bedtime, but insulin was stopped during her hospital stay knees did not February when she was found to be having low blood sugars  - currently only taking metformin 1000 mg twice daily  - POCT HbA1c  6 3  - will check CMP, urine for microalbuminurea

## 2018-04-18 NOTE — PROGRESS NOTES
Assessment/Plan     Chronic diarrhea  -  Patient was admitted in [facility]  In February 2018 for diarrhea of 10 days with history of fecal incontinence  -  Patient still complains of bowel movements at least twice a day which are semi-formed and takes loperamide daily  -  Colonoscopy and EGD in January 2018 revealed polyps which on biopsy were pre cancerous adenoma and she was recommended colonoscopy in 2 years  - provided referral for GI  - advised to take loperamide on p r n  basis  - advised against chronic PPI use,  Will decrease Protonix to 20 mg daily with plan to taper over next 4-6 weeks  Diabetes mellitus ([facility] [address] )  -  Patient used to take 36 units Lantus at bedtime, but insulin was stopped during her hospital stay knees did not February when she was found to be having low blood sugars  - currently only taking metformin 1000 mg twice daily  - POCT HbA1c  6 3  - will check CMP, urine for microalbuminurea    Diagnoses and all orders for this visit:    Chronic diarrhea  -     Ambulatory referral to Gastroenterology; Future  -     Discontinue: loperamide (IMODIUM) 2 mg capsule; Take 1 capsule (2 mg total) by mouth 4 (four) times a day as needed for diarrhea  -     loperamide (IMODIUM) 2 mg capsule; Take 1 capsule (2 mg total) by mouth as needed for diarrhea    Type 2 diabetes mellitus with complication, without long-term current use of insulin ([facility])  -     Comprehensive metabolic panel; Future  -     Microalbumin / creatinine urine ratio  -     POCT hemoglobin A1c     chief complaint: diarrhea     71-year-old female with past medical history significant for diabetes mellitus type 2 , gout, spinal stenosis presents to the office with complaints of chronic diarrhea    Patient states that she had colonoscopy and EGD in January of this year and after a week started to have diarrhea which continued for 10 days and she had to be admitted in [facility]   She was found to have low blood sugars during admission and Lantus was stopped and she was discharged on metformin 1000 mg twice daily with Imodium as needed  Patient states that she continued to have watery diarrhea which is gradually improving and now she goes 2 to 3 times a day and has 7 solid stools  Patient wants a refill for Imodium  Patient also wants to schedule of Medicare well  Visit  She also takes PPI daily for last 10-15 years and smokes 4 cigarettes a day  The following portions of the patient's history were reviewed and updated as appropriate: allergies, current medications, past family history, past medical history, past social history, past surgical history and problem list     Review of Systems   Constitutional: Negative for activity change, appetite change and fever  HENT: Negative for congestion, facial swelling, sinus pain, sneezing and sore throat  Eyes: Negative for discharge and itching  Respiratory: Negative for apnea, chest tightness and shortness of breath  Cardiovascular: Negative for chest pain and palpitations  Gastrointestinal: Positive for diarrhea  Negative for abdominal distention and abdominal pain  Genitourinary: Negative for difficulty urinating  Musculoskeletal: Positive for back pain  Neurological: Positive for numbness  Psychiatric/Behavioral: Negative for behavioral problems  Objective     Vitals:Blood pressure 98/60, pulse 68, temperature 97 6 °F (36 4 °C), resp  rate 16  Physical Exam:  Physical Exam   Constitutional: She is oriented to person, place, and time  She appears well-developed and well-nourished  HENT:   Head: Normocephalic and atraumatic  Eyes: Conjunctivae are normal    Neck: Normal range of motion  Cardiovascular: Normal rate, regular rhythm and normal heart sounds  No murmur heard  Pulmonary/Chest: Effort normal and breath sounds normal  No respiratory distress  Abdominal: Soft  Bowel sounds are normal  She exhibits no distension  There is no tenderness  Musculoskeletal: Normal range of motion  Neurological: She is alert and oriented to person, place, and time  Skin: Skin is warm  No rash noted         Lab Results:    A1c 6 3

## 2018-04-18 NOTE — ASSESSMENT & PLAN NOTE
-  Patient was admitted in [facility]  In February 2018 for diarrhea of 10 days with history of fecal incontinence  -  Patient still complains of bowel movements at least twice a day which are semi-formed and takes loperamide daily  -  Colonoscopy and EGD in January 2018 revealed polyps which on biopsy were pre cancerous adenoma and she was recommended colonoscopy in 2 years  - provided referral for GI  - advised to take loperamide on p r n  basis  - advised against chronic PPI use,  Will decrease Protonix to 20 mg daily with plan to taper over next 4-6 weeks

## 2018-04-27 DIAGNOSIS — R52 PAIN: ICD-10-CM

## 2018-04-27 DIAGNOSIS — J30.9 ALLERGIC RHINITIS, UNSPECIFIED SEASONALITY, UNSPECIFIED TRIGGER: Primary | ICD-10-CM

## 2018-04-27 DIAGNOSIS — I10 HYPERTENSION, UNSPECIFIED TYPE: ICD-10-CM

## 2018-04-30 RX ORDER — GABAPENTIN 300 MG/1
CAPSULE ORAL
Qty: 180 CAPSULE | Refills: 2 | Status: SHIPPED | OUTPATIENT
Start: 2018-04-30 | End: 2018-07-31 | Stop reason: ALTCHOICE

## 2018-04-30 RX ORDER — LEVOCETIRIZINE DIHYDROCHLORIDE 5 MG/1
TABLET, FILM COATED ORAL
Qty: 30 TABLET | Refills: 5 | Status: SHIPPED | OUTPATIENT
Start: 2018-04-30 | End: 2018-07-31 | Stop reason: ALTCHOICE

## 2018-04-30 RX ORDER — LISINOPRIL 10 MG/1
TABLET ORAL
Qty: 90 TABLET | Refills: 0 | Status: SHIPPED | OUTPATIENT
Start: 2018-04-30 | End: 2018-07-31 | Stop reason: ALTCHOICE

## 2018-05-02 ENCOUNTER — TELEPHONE (OUTPATIENT)
Dept: FAMILY MEDICINE CLINIC | Facility: CLINIC | Age: 73
End: 2018-05-02

## 2018-05-03 DIAGNOSIS — Z00.00 HEALTH CARE MAINTENANCE: Primary | ICD-10-CM

## 2018-05-03 RX ORDER — TOCOPHERSOLAN (VITAMIN E TPGS) 400/15ML
1 LIQUID (ML) ORAL DAILY
Qty: 30 EACH | Refills: 3 | Status: SHIPPED | OUTPATIENT
Start: 2018-05-03 | End: 2018-05-04

## 2018-05-04 ENCOUNTER — TELEPHONE (OUTPATIENT)
Dept: FAMILY MEDICINE CLINIC | Facility: CLINIC | Age: 73
End: 2018-05-04

## 2018-05-04 DIAGNOSIS — Z78.0 MENOPAUSE: Primary | ICD-10-CM

## 2018-05-04 DIAGNOSIS — Z00.00 HEALTH CARE MAINTENANCE: Primary | ICD-10-CM

## 2018-05-04 RX ORDER — VITAMIN B COMPLEX
1 TABLET ORAL 2 TIMES DAILY
Qty: 180 TABLET | Refills: 1 | Status: SHIPPED | OUTPATIENT
Start: 2018-05-04 | End: 2018-07-31 | Stop reason: ALTCHOICE

## 2018-05-04 RX ORDER — B-COMPLEX WITH VITAMIN C
1 TABLET ORAL
Qty: 30 TABLET | Refills: 3 | Status: SHIPPED | OUTPATIENT
Start: 2018-05-04 | End: 2018-05-04

## 2018-05-04 NOTE — TELEPHONE ENCOUNTER
Pharmacy calling for clarificiation order for calcium vitamin d this time included magnesium did you really want to change the product the pt is taking or just refill her current med calcium 500 vit d 400 if you want to send new order thank you

## 2018-05-04 NOTE — TELEPHONE ENCOUNTER
Pharmacy left voice message, patient's calcium-vitamin d supplement needs to be changed to chewable tablet  Ins will not cover plain

## 2018-05-04 NOTE — TELEPHONE ENCOUNTER
Pharmacist called, they are looking for patient to have Calcium 500mg Vitamin D 400mg chewable  Patient had previously been taking that

## 2018-05-06 DIAGNOSIS — Z00.00 HEALTHCARE MAINTENANCE: Primary | ICD-10-CM

## 2018-06-02 DIAGNOSIS — Z00.00 HEALTHCARE MAINTENANCE: ICD-10-CM

## 2018-06-02 DIAGNOSIS — E11.8 TYPE 2 DIABETES MELLITUS WITH COMPLICATION (HCC): ICD-10-CM

## 2018-06-04 RX ORDER — HYDROCHLOROTHIAZIDE 50 MG/1
TABLET ORAL
Qty: 60 TABLET | Refills: 5 | Status: SHIPPED | OUTPATIENT
Start: 2018-06-04 | End: 2018-07-31 | Stop reason: ALTCHOICE

## 2018-06-30 DIAGNOSIS — K21.9 GASTROESOPHAGEAL REFLUX DISEASE, ESOPHAGITIS PRESENCE NOT SPECIFIED: ICD-10-CM

## 2018-06-30 DIAGNOSIS — M1A.9XX0 CHRONIC GOUT WITHOUT TOPHUS, UNSPECIFIED CAUSE, UNSPECIFIED SITE: ICD-10-CM

## 2018-07-02 RX ORDER — PANTOPRAZOLE SODIUM 40 MG/1
TABLET, DELAYED RELEASE ORAL
Qty: 90 TABLET | Refills: 0 | Status: SHIPPED | OUTPATIENT
Start: 2018-07-02 | End: 2018-07-31 | Stop reason: ALTCHOICE

## 2018-07-02 RX ORDER — ALLOPURINOL 100 MG/1
TABLET ORAL
Qty: 90 TABLET | Refills: 0 | Status: SHIPPED | OUTPATIENT
Start: 2018-07-02 | End: 2018-07-31 | Stop reason: ALTCHOICE

## 2018-07-30 ENCOUNTER — OFFICE VISIT (OUTPATIENT)
Dept: FAMILY MEDICINE CLINIC | Facility: CLINIC | Age: 73
End: 2018-07-30
Payer: COMMERCIAL

## 2018-07-30 VITALS
BODY MASS INDEX: 33.3 KG/M2 | HEART RATE: 76 BPM | DIASTOLIC BLOOD PRESSURE: 90 MMHG | WEIGHT: 176.4 LBS | SYSTOLIC BLOOD PRESSURE: 140 MMHG | HEIGHT: 61 IN | TEMPERATURE: 96.2 F | RESPIRATION RATE: 16 BRPM

## 2018-07-30 DIAGNOSIS — E11.8 TYPE 2 DIABETES MELLITUS WITH COMPLICATION, WITHOUT LONG-TERM CURRENT USE OF INSULIN (HCC): Primary | ICD-10-CM

## 2018-07-30 DIAGNOSIS — I10 HYPERTENSION, UNSPECIFIED TYPE: ICD-10-CM

## 2018-07-30 DIAGNOSIS — K52.9 CHRONIC DIARRHEA: ICD-10-CM

## 2018-07-30 LAB — SL AMB POCT HEMOGLOBIN AIC: 6.3

## 2018-07-30 PROCEDURE — 1101F PT FALLS ASSESS-DOCD LE1/YR: CPT | Performed by: FAMILY MEDICINE

## 2018-07-30 PROCEDURE — 3725F SCREEN DEPRESSION PERFORMED: CPT | Performed by: FAMILY MEDICINE

## 2018-07-30 PROCEDURE — 3008F BODY MASS INDEX DOCD: CPT | Performed by: FAMILY MEDICINE

## 2018-07-30 PROCEDURE — 99213 OFFICE O/P EST LOW 20 MIN: CPT | Performed by: FAMILY MEDICINE

## 2018-07-30 PROCEDURE — 83036 HEMOGLOBIN GLYCOSYLATED A1C: CPT | Performed by: FAMILY MEDICINE

## 2018-07-31 NOTE — ASSESSMENT & PLAN NOTE
Lab Results   Component Value Date    HGBA1C 6 3 07/30/2018       No results for input(s): POCGLU in the last 72 hours  Well controlled, continue metformin 1000 mg b i d   Foot exam performed today, normal   Patient was advised urine for microalbumin, CMP, FLP in her last was it would should the patient still has not gotten done  Encouraged to get them done    Follow-up in 3months

## 2018-07-31 NOTE — PROGRESS NOTES
Assessment/Plan:    Diabetes mellitus ([facility] [address] )  Lab Results   Component Value Date    HGBA1C 6 3 07/30/2018       No results for input(s): POCGLU in the last 72 hours  Well controlled, continue metformin 1000 mg b i d   Foot exam performed today, normal   Patient was advised urine for microalbumin, CMP, FLP in her last was it would should the patient still has not gotten done  Encouraged to get them done  Follow-up in 3months      Hypertension  Well controlled continue medications, lisinopril 10 mg daily, amlodipine 5 mg daily       Diagnoses and all orders for this visit:    Type 2 diabetes mellitus with complication, without long-term current use of insulin (HCC)  -     POCT hemoglobin A1c  -     Lipid panel; Future    Hypertension, unspecified type  -     Lipid panel; Future    Chronic diarrhea        Subjective:      Patient ID: Gladis Rouse is a 68 y o  female  21-year-old presented to office for a follow-up visit with her fiance  She has no acute complaints this visit, and wants to know whether her insurance sent paperwork for scooter/walker or not  She states her chronic diarrhea is better and diabetes is well controlled  The following portions of the patient's history were reviewed and updated as appropriate: allergies, current medications, past family history, past medical history, past social history, past surgical history and problem list     Review of Systems   Constitutional: Negative for activity change, appetite change, fatigue and fever  HENT: Negative  Respiratory: Negative for cough, chest tightness and shortness of breath  Cardiovascular: Negative for chest pain  Gastrointestinal: Negative for abdominal distention, constipation, nausea and vomiting  Genitourinary: Negative for difficulty urinating  Neurological: Negative for light-headedness and headaches           Objective:      /90   Pulse 76   Temp (!) 96 2 °F (35 7 °C)   Resp 16   Ht 5' 1 1" (1 552 m) Wt 80 kg (176 lb 6 4 oz)   BMI 33 22 kg/m²          Physical Exam   Constitutional: She is oriented to person, place, and time  She appears well-developed and well-nourished  HENT:   Head: Normocephalic and atraumatic  Right Ear: External ear normal    Left Ear: External ear normal    Eyes: Conjunctivae and EOM are normal    Neck: Normal range of motion  Neck supple  Cardiovascular: Normal rate, regular rhythm and normal heart sounds  Exam reveals no friction rub  No murmur heard  Pulses:       Dorsalis pedis pulses are 2+ on the right side, and 2+ on the left side  Posterior tibial pulses are 2+ on the right side, and 2+ on the left side  Pulmonary/Chest: Effort normal and breath sounds normal  No respiratory distress  She has no wheezes  She has no rales  Abdominal: Soft  Bowel sounds are normal  She exhibits no distension  There is no tenderness  Musculoskeletal: Normal range of motion  Feet:   Right Foot:   Skin Integrity: Negative for ulcer, skin breakdown, erythema, warmth, callus or dry skin  Left Foot:   Skin Integrity: Negative for ulcer, skin breakdown, erythema, warmth, callus or dry skin  Neurological: She is alert and oriented to person, place, and time  Skin: Skin is warm and dry  Nursing note and vitals reviewed  Patient's shoes and socks removed  Right Foot/Ankle   Right Foot Inspection  Skin Exam: skin normal and skin intact no dry skin, no warmth, no callus, no erythema, no maceration, no abnormal color, no pre-ulcer, no ulcer and no callus                          Toe Exam: ROM and strength within normal limits  Sensory       Monofilament testing: intact  Vascular    The right DP pulse is 2+  The right PT pulse is 2+       Left Foot/Ankle  Left Foot Inspection  Skin Exam: skin normal and skin intactno dry skin, no warmth, no erythema, no maceration, normal color, no pre-ulcer, no ulcer and no callus                                         Sensory Monofilament: intact  Vascular    The left DP pulse is 2+  The left PT pulse is 2+

## 2018-08-20 LAB
ALBUMIN SERPL-MCNC: 3.6 G/DL (ref 3.6–5.1)
ALBUMIN/GLOB SERPL: 1.4 (CALC) (ref 1–2.5)
ALP SERPL-CCNC: 77 U/L (ref 33–130)
ALT SERPL-CCNC: 5 U/L (ref 6–29)
AST SERPL-CCNC: 9 U/L (ref 10–35)
BILIRUB SERPL-MCNC: 0.3 MG/DL (ref 0.2–1.2)
BUN SERPL-MCNC: 15 MG/DL (ref 7–25)
BUN/CREAT SERPL: 12 (CALC) (ref 6–22)
CALCIUM SERPL-MCNC: 9.5 MG/DL (ref 8.6–10.4)
CHLORIDE SERPL-SCNC: 105 MMOL/L (ref 98–110)
CHOLEST SERPL-MCNC: 262 MG/DL
CHOLEST/HDLC SERPL: 10.9 (CALC)
CO2 SERPL-SCNC: 30 MMOL/L (ref 20–32)
CREAT SERPL-MCNC: 1.27 MG/DL (ref 0.6–0.93)
GLOBULIN SER CALC-MCNC: 2.6 G/DL (CALC) (ref 1.9–3.7)
GLUCOSE SERPL-MCNC: 149 MG/DL (ref 65–99)
HDLC SERPL-MCNC: 24 MG/DL
NONHDLC SERPL-MCNC: 238 MG/DL (CALC)
POTASSIUM SERPL-SCNC: 4.1 MMOL/L (ref 3.5–5.3)
PROT SERPL-MCNC: 6.2 G/DL (ref 6.1–8.1)
SL AMB EGFR AFRICAN AMERICAN: 49 ML/MIN/1.73M2
SL AMB EGFR NON AFRICAN AMERICAN: 42 ML/MIN/1.73M2
SODIUM SERPL-SCNC: 140 MMOL/L (ref 135–146)
TRIGL SERPL-MCNC: 421 MG/DL

## 2018-09-01 DIAGNOSIS — R52 PAIN: ICD-10-CM

## 2018-09-04 RX ORDER — GABAPENTIN 300 MG/1
CAPSULE ORAL
Qty: 180 CAPSULE | Refills: 2 | Status: SHIPPED | OUTPATIENT
Start: 2018-09-04 | End: 2018-10-30 | Stop reason: SDUPTHER

## 2018-09-28 DIAGNOSIS — Z00.00 PREVENTATIVE HEALTH CARE: ICD-10-CM

## 2018-09-30 DIAGNOSIS — I10 HYPERTENSION, UNSPECIFIED TYPE: ICD-10-CM

## 2018-10-01 PROCEDURE — 4010F ACE/ARB THERAPY RXD/TAKEN: CPT | Performed by: FAMILY MEDICINE

## 2018-10-01 RX ORDER — LISINOPRIL 10 MG/1
TABLET ORAL
Qty: 90 TABLET | Refills: 0 | Status: SHIPPED | OUTPATIENT
Start: 2018-10-01 | End: 2019-08-27 | Stop reason: ALTCHOICE

## 2018-10-01 RX ORDER — ASPIRIN 81 MG/1
TABLET, DELAYED RELEASE ORAL
Qty: 30 TABLET | Refills: 1 | Status: SHIPPED | OUTPATIENT
Start: 2018-10-01 | End: 2018-11-27 | Stop reason: SDUPTHER

## 2018-10-29 ENCOUNTER — TELEPHONE (OUTPATIENT)
Dept: FAMILY MEDICINE CLINIC | Facility: CLINIC | Age: 73
End: 2018-10-29

## 2018-10-29 NOTE — TELEPHONE ENCOUNTER
Patient left voice message requesting refill of Gabapentin  Her last visit was 7/30/18, she was to follow up in 3 months for diabetes check  Please call patient to schedule appointment  Thanks!

## 2018-10-30 DIAGNOSIS — R52 PAIN: ICD-10-CM

## 2018-10-30 RX ORDER — GABAPENTIN 300 MG/1
300 CAPSULE ORAL 3 TIMES DAILY
Qty: 180 CAPSULE | Refills: 0 | Status: SHIPPED | OUTPATIENT
Start: 2018-10-30 | End: 2018-11-19 | Stop reason: SDUPTHER

## 2018-10-31 DIAGNOSIS — J30.9 ALLERGIC RHINITIS, UNSPECIFIED SEASONALITY, UNSPECIFIED TRIGGER: ICD-10-CM

## 2018-11-01 RX ORDER — LEVOCETIRIZINE DIHYDROCHLORIDE 5 MG/1
TABLET, FILM COATED ORAL
Qty: 30 TABLET | Refills: 5 | Status: SHIPPED | OUTPATIENT
Start: 2018-11-01 | End: 2019-05-04 | Stop reason: SDUPTHER

## 2018-11-19 DIAGNOSIS — R52 PAIN: ICD-10-CM

## 2018-11-20 ENCOUNTER — TRANSCRIBE ORDERS (OUTPATIENT)
Dept: LAB | Facility: CLINIC | Age: 73
End: 2018-11-20

## 2018-11-20 ENCOUNTER — OFFICE VISIT (OUTPATIENT)
Dept: FAMILY MEDICINE CLINIC | Facility: CLINIC | Age: 73
End: 2018-11-20
Payer: COMMERCIAL

## 2018-11-20 ENCOUNTER — HOSPITAL ENCOUNTER (OUTPATIENT)
Dept: NON INVASIVE DIAGNOSTICS | Facility: CLINIC | Age: 73
Discharge: HOME | End: 2018-11-20
Payer: COMMERCIAL

## 2018-11-20 VITALS
WEIGHT: 154.8 LBS | BODY MASS INDEX: 29.23 KG/M2 | TEMPERATURE: 96.7 F | DIASTOLIC BLOOD PRESSURE: 80 MMHG | SYSTOLIC BLOOD PRESSURE: 124 MMHG | RESPIRATION RATE: 12 BRPM | HEART RATE: 66 BPM | HEIGHT: 61 IN

## 2018-11-20 DIAGNOSIS — M79.605 LEFT LEG PAIN: ICD-10-CM

## 2018-11-20 DIAGNOSIS — M79.605 LEFT LEG PAIN: Primary | ICD-10-CM

## 2018-11-20 PROCEDURE — 93923 UPR/LXTR ART STDY 3+ LVLS: CPT

## 2018-11-20 PROCEDURE — 4004F PT TOBACCO SCREEN RCVD TLK: CPT | Performed by: EMERGENCY MEDICINE

## 2018-11-20 PROCEDURE — 3008F BODY MASS INDEX DOCD: CPT | Performed by: EMERGENCY MEDICINE

## 2018-11-20 PROCEDURE — 1160F RVW MEDS BY RX/DR IN RCRD: CPT | Performed by: EMERGENCY MEDICINE

## 2018-11-20 PROCEDURE — 93923 UPR/LXTR ART STDY 3+ LVLS: CPT | Performed by: SURGERY

## 2018-11-20 PROCEDURE — 4040F PNEUMOC VAC/ADMIN/RCVD: CPT | Performed by: EMERGENCY MEDICINE

## 2018-11-20 PROCEDURE — 99213 OFFICE O/P EST LOW 20 MIN: CPT | Performed by: EMERGENCY MEDICINE

## 2018-11-20 RX ORDER — GABAPENTIN 300 MG/1
300 CAPSULE ORAL 3 TIMES DAILY
Qty: 180 CAPSULE | Refills: 1 | Status: SHIPPED | OUTPATIENT
Start: 2018-11-20 | End: 2019-01-22 | Stop reason: SDUPTHER

## 2018-11-20 NOTE — ASSESSMENT & PLAN NOTE
- Right leg 2 cm larger in diameter than left leg, left leg is the leg with pain;  Left leg slightly cooler than right leg;  Bilateral pulses faintly palpable;   No color change between legs  -  Arranged for a ride to [address] [place] [address] for bilateral arterial duplex ultrasound  -  Continue pain management with Tylenol or ibuprofen

## 2018-11-20 NOTE — PROGRESS NOTES
Assessment/Plan:    Left leg pain  - Right leg 2 cm larger in diameter than left leg, left leg is the leg with pain;  Left leg slightly cooler than right leg;  Bilateral pulses faintly palpable; No color change between legs  -  Arranged for a ride to [facility] for bilateral arterial duplex ultrasound  -  Continue pain management with Tylenol or ibuprofen          Subjective:      Patient ID: Nadege Ramirez is a 68 y o  female  Patient is a 71-year-old female presenting after an emergency department visit at [facility] for left leg pain  She has a history of diabetes which she takes metformin, her last A1c was 6 3 at the end of July  Patient states that some point last week her left leg started to hurt her  She was watching TV when the pain started  Pain has gotten worse since then and she went to the [facility] Emergency Department on Friday  Records are not available from the hospital visit  Per the patient she did not receive any antibiotics or imaging  She was told to take Tylenol or ibuprofen for the pain  And received a shot of something in her thigh  She also reports they told her "she is bleeding underneath the skin of the leg"  Patient can bear weight however she is assistance with walking  She normally uses a walker at home and is currently in a wheelchair  Patient states she feels like the pain is a squeezing of her lower leg  Patient states the NSAIDs are helping a little bit but what helps most is resting and propping her leg up  She has no history of DVT and was recently hospitalized for 2 days due to diarrhea and dehydration  She has a history of spinal stenosis in her lower back, and chart review there is no imaging to support this  Patient complains that her left leg is cooler than her right leg  She denies any shortness of breath, chest discomfort, headache, fall, trauma to the leg          The following portions of the patient's history were reviewed and updated as appropriate: allergies, current medications, past family history, past medical history, past social history, past surgical history and problem list     Review of Systems   Constitutional: Negative for chills and fever  HENT: Negative for congestion and sore throat  Respiratory: Negative for cough and shortness of breath  Cardiovascular: Positive for leg swelling  Negative for chest pain and palpitations  Gastrointestinal: Negative for abdominal pain  Musculoskeletal: Positive for gait problem and myalgias  Skin: Negative for color change, rash and wound  Neurological: Positive for weakness  Negative for light-headedness and headaches  Weakness to left leg         Objective:      /80 (BP Location: Left arm, Patient Position: Sitting, Cuff Size: Large)   Pulse 66   Temp (!) 96 7 °F (35 9 °C) (Tympanic)   Resp 12   Ht 5' 1" (1 549 m)   Wt 70 2 kg (154 lb 12 8 oz)   BMI 29 25 kg/m²          Physical Exam   Constitutional: She is oriented to person, place, and time  She appears well-developed and well-nourished  No distress  HENT:   Head: Normocephalic and atraumatic  Eyes: EOM are normal  Right eye exhibits no discharge  Left eye exhibits no discharge  Cardiovascular: Normal rate, regular rhythm, normal heart sounds and intact distal pulses  No murmur heard  Bilateral lower extremity pulses faintly palpable,  Bilateral DP pulses found the Doppler   Pulmonary/Chest: Effort normal and breath sounds normal  No respiratory distress  Abdominal: Soft  She exhibits no distension  Musculoskeletal: She exhibits edema and tenderness  She exhibits no deformity  Tenderness to palpation in left lower extremity  Bilateral pitting edema to calves  Left leg slightly cooler than right leg  Right leg 2 cm larger in diameter than left leg;  the leg without pain is smaller diameter  Weakness to left knee flexion-extension,  Dorsi and plantar flexion   Patient able to bear weight but walks with difficulty  Moving from wheelchair to examination bed difficulty  Neurological: She is alert and oriented to person, place, and time  No cranial nerve deficit  Skin: She is not diaphoretic     Left  Lower leg slightly cooler than right

## 2018-11-21 NOTE — PROGRESS NOTES
Tiny Bodily, I just wanted to make sure that this came to your in basket as well instead of just the ER result bin

## 2018-11-27 DIAGNOSIS — Z00.00 PREVENTATIVE HEALTH CARE: ICD-10-CM

## 2018-11-27 RX ORDER — ASPIRIN 81 MG/1
TABLET, DELAYED RELEASE ORAL
Qty: 30 TABLET | Refills: 1 | Status: SHIPPED | OUTPATIENT
Start: 2018-11-27 | End: 2018-11-30 | Stop reason: SDUPTHER

## 2018-11-30 DIAGNOSIS — K21.9 GASTROESOPHAGEAL REFLUX DISEASE, ESOPHAGITIS PRESENCE NOT SPECIFIED: Primary | ICD-10-CM

## 2018-11-30 DIAGNOSIS — Z00.00 PREVENTATIVE HEALTH CARE: ICD-10-CM

## 2018-11-30 RX ORDER — ASPIRIN 81 MG/1
TABLET, DELAYED RELEASE ORAL
Qty: 30 TABLET | Refills: 1 | Status: SHIPPED | OUTPATIENT
Start: 2018-11-30 | End: 2019-03-29 | Stop reason: SDUPTHER

## 2018-11-30 RX ORDER — PANTOPRAZOLE SODIUM 40 MG/1
TABLET, DELAYED RELEASE ORAL
Qty: 90 TABLET | Refills: 0 | Status: SHIPPED | OUTPATIENT
Start: 2018-11-30 | End: 2019-03-08 | Stop reason: SDUPTHER

## 2018-12-27 DIAGNOSIS — Z00.00 HEALTHCARE MAINTENANCE: ICD-10-CM

## 2018-12-28 RX ORDER — HYDROCHLOROTHIAZIDE 50 MG/1
TABLET ORAL
Qty: 60 TABLET | Refills: 5 | Status: SHIPPED | OUTPATIENT
Start: 2018-12-28 | End: 2019-01-22 | Stop reason: SDUPTHER

## 2019-01-14 DIAGNOSIS — R52 PAIN: ICD-10-CM

## 2019-01-14 RX ORDER — GABAPENTIN 300 MG/1
CAPSULE ORAL
Qty: 180 CAPSULE | Refills: 0 | Status: SHIPPED | OUTPATIENT
Start: 2019-01-14 | End: 2019-02-04 | Stop reason: ALTCHOICE

## 2019-01-22 DIAGNOSIS — R52 PAIN: ICD-10-CM

## 2019-01-22 DIAGNOSIS — Z00.00 HEALTHCARE MAINTENANCE: ICD-10-CM

## 2019-01-22 RX ORDER — GABAPENTIN 300 MG/1
300 CAPSULE ORAL 3 TIMES DAILY
Qty: 180 CAPSULE | Refills: 1 | Status: SHIPPED | OUTPATIENT
Start: 2019-01-22 | End: 2019-08-27

## 2019-02-04 ENCOUNTER — OFFICE VISIT (OUTPATIENT)
Dept: FAMILY MEDICINE CLINIC | Facility: CLINIC | Age: 74
End: 2019-02-04

## 2019-02-04 VITALS
BODY MASS INDEX: 30.21 KG/M2 | WEIGHT: 160 LBS | DIASTOLIC BLOOD PRESSURE: 68 MMHG | HEART RATE: 72 BPM | HEIGHT: 61 IN | RESPIRATION RATE: 14 BRPM | SYSTOLIC BLOOD PRESSURE: 120 MMHG | TEMPERATURE: 99 F

## 2019-02-04 DIAGNOSIS — E11.40 TYPE 2 DIABETES MELLITUS WITH DIABETIC NEUROPATHY, WITHOUT LONG-TERM CURRENT USE OF INSULIN (HCC): ICD-10-CM

## 2019-02-04 DIAGNOSIS — I10 HYPERTENSION, UNSPECIFIED TYPE: ICD-10-CM

## 2019-02-04 DIAGNOSIS — E11.8 TYPE 2 DIABETES MELLITUS WITH COMPLICATION, WITHOUT LONG-TERM CURRENT USE OF INSULIN (HCC): ICD-10-CM

## 2019-02-04 DIAGNOSIS — M79.605 LEFT LEG PAIN: Primary | ICD-10-CM

## 2019-02-04 PROBLEM — E11.49 DIABETES MELLITUS WITH NEUROLOGICAL MANIFESTATIONS (HCC): Status: ACTIVE | Noted: 2018-03-09

## 2019-02-04 PROCEDURE — 99213 OFFICE O/P EST LOW 20 MIN: CPT | Performed by: FAMILY MEDICINE

## 2019-02-04 PROCEDURE — 3078F DIAST BP <80 MM HG: CPT | Performed by: FAMILY MEDICINE

## 2019-02-04 PROCEDURE — 3074F SYST BP LT 130 MM HG: CPT | Performed by: FAMILY MEDICINE

## 2019-02-04 RX ORDER — FUROSEMIDE 20 MG/1
20 TABLET ORAL DAILY
Qty: 7 TABLET | Refills: 0 | Status: SHIPPED | OUTPATIENT
Start: 2019-02-04 | End: 2019-08-27 | Stop reason: ALTCHOICE

## 2019-02-04 NOTE — PROGRESS NOTES
Assessment/Plan     Left leg pain  Reviewed vascular DAFNE and waveform analysis, no significant change in disease process since 2007  DAFNE of left leg 1 22  Will also check x-ray knee and hip, advised patient to start compression stockings for her venous insufficiency, will give her a trial of Lasix 20 mg for a week and have her follow up in 2 weeks  Diabetes mellitus with neurological manifestations ([facility] )  Lab Results   Component Value Date    HGBA1C 6 3 07/30/2018       No results for input(s): POCGLU in the last 72 hours  None    Continue metformin 1000 mg b i d  Will check A1c, CMP  Continue Neurontin 300 mg t i d  For neuropathy  Advised patient on quitting smoking, she currently smokes 1 cigarette today    Hypertension  Well controlled with lisinopril 10 mg daily  Will check lipid panel, CMP    Diagnoses and all orders for this visit:    Left leg pain  -     XR knee 3 vw left non injury; Future  -     XR hip/pelv 2-3 vws left if performed; Future  -     XR ankle 3+ vw left; Future  -     Compression Stocking  -     furosemide (LASIX) 20 mg tablet; Take 1 tablet (20 mg total) by mouth daily  -     VAS lower limb venous duplex study, unilateral/limited; Future    Hypertension, unspecified type  -     CBC and differential; Future  -     Lipid panel; Future    Type 2 diabetes mellitus with complication, without long-term current use of insulin ([facility])  -     Comprehensive metabolic panel; Future  -     HEMOGLOBIN A1C W/ EAG ESTIMATION; Future    Type 2 diabetes mellitus with diabetic neuropathy, without long-term current use of insulin ([facility] )         Subjective     Chief Complaint   Patient presents with    Leg Pain     left "feels like I have lead in the leg and now my right foot is beginning to feel the same way  67-year-old female presented to office for a follow-up visit  Patient was here in November with complaints of pain in her left leg, and was advised vascular DAFNE at that time    Patient states that since then the pain is getting worse, extends from her thigh all the way down to her foot, patient is not able to walk because of this pain  Pain is present at rest too but increases with walking  She denies any recent trauma, patient states that she even fell once because of the pain  Patient is tearful today  She is a smoker too and smokes 1 cigarette a day        The following portions of the patient's history were reviewed and updated as appropriate: allergies, current medications, past family history, past medical history, past social history, past surgical history and problem list     Review of Systems   Constitutional: Positive for activity change  Negative for chills and fever  HENT: Negative for congestion  Respiratory: Negative for shortness of breath  Cardiovascular: Negative for chest pain  Gastrointestinal: Negative for diarrhea, nausea and vomiting  Genitourinary: Negative for difficulty urinating  Musculoskeletal: Positive for gait problem  Leg pain   Neurological: Negative for dizziness and headaches  Objective     Vitals:Blood pressure 120/68, pulse 72, temperature 99 °F (37 2 °C), temperature source Tympanic, resp  rate 14, height 5' 1" (1 549 m), weight 72 6 kg (160 lb)  Physical Exam:  Physical Exam   Constitutional: She is oriented to person, place, and time  She appears well-developed and well-nourished  HENT:   Head: Normocephalic and atraumatic  Mouth/Throat: Oropharynx is clear and moist    Eyes: Conjunctivae and EOM are normal    Neck: Neck supple  Cardiovascular: Normal rate, regular rhythm and normal heart sounds  Exam reveals no friction rub  No murmur heard  Pulmonary/Chest: Effort normal and breath sounds normal  No respiratory distress  She has no wheezes  She has no rales  Abdominal: Soft  Bowel sounds are normal  She exhibits no distension  There is no tenderness     Musculoskeletal: She exhibits edema (2+ pitting edema bilaterally) and tenderness  2+ pitting edema bilaterally  No significant temperature difference between 2 legs  Dorsalis pedis 2+ bilaterally   Neurological: She is alert and oriented to person, place, and time  Skin: Skin is warm and dry  Vitals reviewed

## 2019-02-04 NOTE — ASSESSMENT & PLAN NOTE
Reviewed vascular DAFNE and waveform analysis, no significant change in disease process since 2007  DAFNE of left leg 1 22  Will also check x-ray knee and hip, advised patient to start compression stockings for her venous insufficiency, will give her a trial of Lasix 20 mg for a week and have her follow up in 2 weeks

## 2019-02-04 NOTE — ASSESSMENT & PLAN NOTE
Lab Results   Component Value Date    HGBA1C 6 3 07/30/2018       No results for input(s): POCGLU in the last 72 hours  None    Continue metformin 1000 mg b i d  Will check A1c, CMP  Continue Neurontin 300 mg t i d   For neuropathy  Advised patient on quitting smoking, she currently smokes 1 cigarette today

## 2019-02-18 ENCOUNTER — TELEPHONE (OUTPATIENT)
Dept: FAMILY MEDICINE CLINIC | Facility: CLINIC | Age: 74
End: 2019-02-18

## 2019-02-18 DIAGNOSIS — Z79.4 TYPE 2 DIABETES MELLITUS WITH COMPLICATION, WITH LONG-TERM CURRENT USE OF INSULIN (HCC): ICD-10-CM

## 2019-02-18 DIAGNOSIS — R26.2 AMBULATORY DYSFUNCTION: Primary | ICD-10-CM

## 2019-02-18 DIAGNOSIS — E11.8 TYPE 2 DIABETES MELLITUS WITH COMPLICATION, WITH LONG-TERM CURRENT USE OF INSULIN (HCC): ICD-10-CM

## 2019-02-18 DIAGNOSIS — I87.2 VENOUS INSUFFICIENCY OF LEFT LEG: ICD-10-CM

## 2019-02-18 NOTE — TELEPHONE ENCOUNTER
Spoke with patient, told her insurance requires face to face evaluation for wheelchair and power chairs  Provided her with information to be evaluated at [facility] wheelchair clinic  Entered order, mailed to her home

## 2019-02-18 NOTE — TELEPHONE ENCOUNTER
Pt  calling to find out if Dr Brigid Fitzpatrick called [place] regarding a scooter for pt, I didn't see a note in here in regards to that , pt mentioned it was discussed when she was  last seen by Dr Brigid Fitzpatrick on 2/4/19 , Please give pt a call with status   Thanks

## 2019-03-08 DIAGNOSIS — K21.9 GASTROESOPHAGEAL REFLUX DISEASE, ESOPHAGITIS PRESENCE NOT SPECIFIED: ICD-10-CM

## 2019-03-11 RX ORDER — PANTOPRAZOLE SODIUM 40 MG/1
TABLET, DELAYED RELEASE ORAL
Qty: 90 TABLET | Refills: 0 | Status: SHIPPED | OUTPATIENT
Start: 2019-03-11 | End: 2019-05-13 | Stop reason: SDUPTHER

## 2019-03-29 DIAGNOSIS — Z00.00 PREVENTATIVE HEALTH CARE: ICD-10-CM

## 2019-03-29 RX ORDER — ASPIRIN 81 MG/1
TABLET, DELAYED RELEASE ORAL
Qty: 30 TABLET | Refills: 1 | Status: SHIPPED | OUTPATIENT
Start: 2019-03-29 | End: 2019-08-27

## 2019-04-01 DIAGNOSIS — Z00.00 PREVENTATIVE HEALTH CARE: ICD-10-CM

## 2019-04-01 RX ORDER — ASPIRIN 81 MG/1
TABLET, DELAYED RELEASE ORAL
Qty: 30 TABLET | Refills: 1 | Status: SHIPPED | OUTPATIENT
Start: 2019-04-01 | End: 2019-08-27

## 2019-04-16 ENCOUNTER — TELEPHONE (OUTPATIENT)
Dept: FAMILY MEDICINE CLINIC | Facility: CLINIC | Age: 74
End: 2019-04-16

## 2019-04-17 ENCOUNTER — TELEPHONE (OUTPATIENT)
Dept: FAMILY MEDICINE CLINIC | Facility: CLINIC | Age: 74
End: 2019-04-17

## 2019-04-17 DIAGNOSIS — N95.1 MENOPAUSAL STATE: Primary | ICD-10-CM

## 2019-04-17 RX ORDER — CALCIUM CARBONATE/VITAMIN D3 600 MG-10
TABLET ORAL
Qty: 60 TABLET | Refills: 5 | Status: SHIPPED | OUTPATIENT
Start: 2019-04-17 | End: 2019-08-27

## 2019-04-23 ENCOUNTER — OFFICE VISIT (OUTPATIENT)
Dept: FAMILY MEDICINE CLINIC | Facility: CLINIC | Age: 74
End: 2019-04-23

## 2019-04-23 ENCOUNTER — HOSPITAL ENCOUNTER (OUTPATIENT)
Dept: RADIOLOGY | Facility: HOSPITAL | Age: 74
Discharge: HOME | End: 2019-04-23
Payer: COMMERCIAL

## 2019-04-23 VITALS
HEART RATE: 64 BPM | BODY MASS INDEX: 32.54 KG/M2 | RESPIRATION RATE: 16 BRPM | WEIGHT: 172.2 LBS | SYSTOLIC BLOOD PRESSURE: 140 MMHG | DIASTOLIC BLOOD PRESSURE: 62 MMHG | TEMPERATURE: 98.7 F

## 2019-04-23 DIAGNOSIS — M79.605 LEFT LEG PAIN: ICD-10-CM

## 2019-04-23 DIAGNOSIS — M79.605 LEFT LEG PAIN: Primary | ICD-10-CM

## 2019-04-23 PROCEDURE — 73502 X-RAY EXAM HIP UNI 2-3 VIEWS: CPT

## 2019-04-23 PROCEDURE — 99213 OFFICE O/P EST LOW 20 MIN: CPT | Performed by: FAMILY MEDICINE

## 2019-04-23 PROCEDURE — 73562 X-RAY EXAM OF KNEE 3: CPT

## 2019-04-23 RX ORDER — TRAMADOL HYDROCHLORIDE 50 MG/1
25 TABLET ORAL
Qty: 30 TABLET | Refills: 0 | Status: SHIPPED | OUTPATIENT
Start: 2019-04-23 | End: 2019-08-27 | Stop reason: ALTCHOICE

## 2019-05-04 DIAGNOSIS — J30.9 ALLERGIC RHINITIS, UNSPECIFIED SEASONALITY, UNSPECIFIED TRIGGER: ICD-10-CM

## 2019-05-05 RX ORDER — LEVOCETIRIZINE DIHYDROCHLORIDE 5 MG/1
TABLET, FILM COATED ORAL
Qty: 30 TABLET | Refills: 5 | Status: SHIPPED | OUTPATIENT
Start: 2019-05-05 | End: 2019-12-16 | Stop reason: SDUPTHER

## 2019-05-13 DIAGNOSIS — K21.9 GASTROESOPHAGEAL REFLUX DISEASE, ESOPHAGITIS PRESENCE NOT SPECIFIED: ICD-10-CM

## 2019-05-13 DIAGNOSIS — J30.9 ALLERGIC RHINITIS, UNSPECIFIED SEASONALITY, UNSPECIFIED TRIGGER: ICD-10-CM

## 2019-05-14 RX ORDER — PANTOPRAZOLE SODIUM 40 MG/1
TABLET, DELAYED RELEASE ORAL
Qty: 90 TABLET | Refills: 0 | Status: SHIPPED | OUTPATIENT
Start: 2019-05-14 | End: 2019-08-27

## 2019-05-14 RX ORDER — LEVOCETIRIZINE DIHYDROCHLORIDE 5 MG/1
TABLET, FILM COATED ORAL
Qty: 30 TABLET | Refills: 5 | Status: SHIPPED | OUTPATIENT
Start: 2019-05-14 | End: 2019-08-07

## 2019-06-17 DIAGNOSIS — K21.9 GASTROESOPHAGEAL REFLUX DISEASE, ESOPHAGITIS PRESENCE NOT SPECIFIED: ICD-10-CM

## 2019-06-17 DIAGNOSIS — R52 PAIN: ICD-10-CM

## 2019-06-17 RX ORDER — PANTOPRAZOLE SODIUM 40 MG/1
TABLET, DELAYED RELEASE ORAL
Qty: 90 TABLET | Refills: 0 | Status: SHIPPED | OUTPATIENT
Start: 2019-06-17 | End: 2019-10-10 | Stop reason: ALTCHOICE

## 2019-06-17 RX ORDER — GABAPENTIN 300 MG/1
CAPSULE ORAL
Qty: 180 CAPSULE | Refills: 0 | Status: SHIPPED | OUTPATIENT
Start: 2019-06-17 | End: 2019-08-27

## 2019-07-16 ENCOUNTER — OFFICE VISIT (OUTPATIENT)
Dept: FAMILY MEDICINE CLINIC | Facility: CLINIC | Age: 74
End: 2019-07-16

## 2019-07-16 VITALS
SYSTOLIC BLOOD PRESSURE: 130 MMHG | RESPIRATION RATE: 16 BRPM | WEIGHT: 161.4 LBS | DIASTOLIC BLOOD PRESSURE: 60 MMHG | HEART RATE: 76 BPM | TEMPERATURE: 98.5 F | HEIGHT: 61 IN | BODY MASS INDEX: 30.47 KG/M2

## 2019-07-16 DIAGNOSIS — M17.12 OSTEOARTHRITIS OF LEFT KNEE, UNSPECIFIED OSTEOARTHRITIS TYPE: Primary | ICD-10-CM

## 2019-07-16 PROCEDURE — 99213 OFFICE O/P EST LOW 20 MIN: CPT | Performed by: FAMILY MEDICINE

## 2019-07-17 NOTE — PROGRESS NOTES
Assessment/Plan     Osteoarthritis of left knee  X-ray done in April showed Moderate narrowing of the lateral tibiofemoral joint space, with degenerative disease in both hips  Patient reports considerable pain in her hip joints and knee joint, and difficulty in ambulation, will refer her to Orthopedic surgery for evaluation  Recommended Tylenol 975 mg t i d  In the meantime for pain  Also offered physical therapy, patient refuses to go for physical therapy at this time    Diagnoses and all orders for this visit:    Osteoarthritis of left knee, unspecified osteoarthritis type  -     Ambulatory referral to Sports Medicine; Future  -     Ambulatory referral to Orthopedic Surgery; Future         Subjective     Chief Complaint   Patient presents with    Follow-up     back and hip pain        67-year-old female presented to office for follow-up visit  Patient reports that she come continues to have pain in her left knee and left hip, back, and difficulty in ambulation  Patient is also very depressed today, recently split up with her boyfriend which was not very pleasant  The following portions of the patient's history were reviewed and updated as appropriate: allergies, current medications, past family history, past medical history, past social history, past surgical history and problem list     Review of Systems   Constitutional: Negative for chills and fever  HENT: Negative for congestion  Respiratory: Negative for shortness of breath  Cardiovascular: Negative for chest pain  Gastrointestinal: Negative for diarrhea, nausea and vomiting  Genitourinary: Negative for difficulty urinating  Musculoskeletal: Positive for arthralgias  Neurological: Negative for headaches  Objective     Vitals:Blood pressure 130/60, pulse 76, temperature 98 5 °F (36 9 °C), resp  rate 16, height 5' 1" (1 549 m), weight 73 2 kg (161 lb 6 4 oz)    Physical Exam:  Physical Exam   Constitutional: She is oriented to person, place, and time  She appears well-developed and well-nourished  HENT:   Head: Normocephalic and atraumatic  Right Ear: External ear normal    Left Ear: External ear normal    Mouth/Throat: Oropharynx is clear and moist    Eyes: Pupils are equal, round, and reactive to light  Conjunctivae and EOM are normal    Neck: Normal range of motion  Neck supple  Cardiovascular: Normal rate, regular rhythm, normal heart sounds and intact distal pulses  Exam reveals no friction rub  No murmur heard  Pulmonary/Chest: Effort normal and breath sounds normal  No respiratory distress  She has no wheezes  She has no rales  Abdominal: Soft  Bowel sounds are normal  She exhibits no distension  There is no tenderness  Musculoskeletal: She exhibits tenderness (Left knee joint)  Range of motion restricted at left hip and knee joint  Neurological: She is alert and oriented to person, place, and time  Skin: Skin is warm and dry

## 2019-07-17 NOTE — ASSESSMENT & PLAN NOTE
X-ray done in April showed Moderate narrowing of the lateral tibiofemoral joint space, with degenerative disease in both hips  Patient reports considerable pain in her hip joints and knee joint, and difficulty in ambulation, will refer her to Orthopedic surgery for evaluation  Recommended Tylenol 975 mg t i d  In the meantime for pain    Also offered physical therapy, patient refuses to go for physical therapy at this time

## 2019-08-07 DIAGNOSIS — J30.9 ALLERGIC RHINITIS, UNSPECIFIED SEASONALITY, UNSPECIFIED TRIGGER: ICD-10-CM

## 2019-08-07 RX ORDER — LEVOCETIRIZINE DIHYDROCHLORIDE 5 MG/1
5 TABLET, FILM COATED ORAL DAILY
Qty: 30 TABLET | Refills: 5 | Status: SHIPPED | OUTPATIENT
Start: 2019-08-07 | End: 2020-03-20 | Stop reason: ALTCHOICE

## 2019-08-08 ENCOUNTER — APPOINTMENT (OUTPATIENT)
Dept: RADIOLOGY | Facility: AMBULARY SURGERY CENTER | Age: 74
End: 2019-08-08
Payer: COMMERCIAL

## 2019-08-08 ENCOUNTER — OFFICE VISIT (OUTPATIENT)
Dept: OBGYN CLINIC | Facility: CLINIC | Age: 74
End: 2019-08-08
Payer: COMMERCIAL

## 2019-08-08 VITALS
HEIGHT: 61 IN | BODY MASS INDEX: 30.4 KG/M2 | WEIGHT: 161 LBS | SYSTOLIC BLOOD PRESSURE: 145 MMHG | HEART RATE: 59 BPM | DIASTOLIC BLOOD PRESSURE: 77 MMHG

## 2019-08-08 DIAGNOSIS — M54.16 LEFT LUMBAR RADICULOPATHY: Primary | ICD-10-CM

## 2019-08-08 DIAGNOSIS — M17.12 OSTEOARTHRITIS OF LEFT KNEE, UNSPECIFIED OSTEOARTHRITIS TYPE: ICD-10-CM

## 2019-08-08 DIAGNOSIS — R29.898 ANKLE WEAKNESS: ICD-10-CM

## 2019-08-08 DIAGNOSIS — M54.50 LUMBAR SPINE PAIN: ICD-10-CM

## 2019-08-08 PROCEDURE — 72100 X-RAY EXAM L-S SPINE 2/3 VWS: CPT

## 2019-08-08 PROCEDURE — 99204 OFFICE O/P NEW MOD 45 MIN: CPT | Performed by: ORTHOPAEDIC SURGERY

## 2019-08-08 NOTE — LETTER
August 8, 2019     Ozzy Perry MD  [place] [id number]    Patient: Johny Obrien   YOB: 1945   Date of Visit: 8/8/2019       Dear Dr Dominga Peace: Thank you for referring Temo Holt to me for evaluation  Below are my notes for this consultation  If you have questions, please do not hesitate to call me  I look forward to following your patient along with you  Sincerely,        Mina Ignacio MD        CC: No Recipients  Mina Ignacio MD  8/8/2019  2:33 PM  Sign at close encounter  Assessment:       1  Left lumbar radiculopathy    2  Osteoarthritis of left knee, unspecified osteoarthritis type    3  Ankle weakness    4  Lumbar spine pain          Plan:        I explained my current clinical findings and reviewed radiological findings with Susie Rocha  I suspect a significant left-sided L5 and/or S1 root compression which is likely the cause of her left lower extremity symptoms  Due to associated motor weakness, I will request an MRI of her lumbar spine for further evaluation  In the interim I will recommend using a MAFO brace on the left ankle/foot to help with her ambulation and decrease of fall risk  If her lumbar spine MRI does reveal any significant root compression, we will consider doing a referral to pain management for trial of lumbar epidural cortisone injection for her current symptoms  Subjective:     Patient ID: Johny Obrien is a 69 y o  female  Chief Complaint:  Left hip and leg weakness and pain    HPI  Susie Rocha is a pleasant 27-year-old lady who is here today for evaluation of left-sided posterior hip pain and bilateral knee pain  Her symptoms started with left posterior hip pain that radiates down her left lower extremity  Symptoms have been present for several months without any preceding trauma    Describes most pain on the lateral and posterolateral aspect of the left hip that radiates down to her left knee and intermittently all the way down her left foot  This leads to ambulatory dysfunction and then she started developing some right knee pain as well she has noticed tingling and numbness as well as weakness of her left ankle and foot  She does report having a sense of instability and tripping over left foot while walking  She currently ambulates with the help of her walker  She denies any bowel or bladder control problems or recent sudden loss of weight or appetite  She also denies any systemic symptoms like fever or chills  She was earlier seen by her primary care physician and had a plain radiograph of the left hip as well as the left knee in April 2019  The left knee radiograph revealed moderate narrowing of the lateral tibia fibular joint space indicative of some degenerative arthritic changes  Hip radiograph revealed minor degenerative changes of both the hips with symmetric joint spaces  Past Medical History:   Diagnosis Date    Abdominal enlargement     Amenorrhea     Back pain     Chronic diarrhea     Diabetes mellitus (HCC)     GI bleeding     Gout     Hypercholesteremia     Hypertension     Kidney lesion     Obesity           Social History     Occupational History    Not on file   Tobacco Use    Smoking status: Current Every Day Smoker    Smokeless tobacco: Never Used    Tobacco comment: NEVER SMOKER AS PER [employer]   Substance and Sexual Activity    Alcohol use: Yes     Comment: rarely; NO ALCOHOL USE AS PER [employer]SCRIPTS    Drug use: No    Sexual activity: Not on file      Review of Systems   Constitutional: Negative  HENT: Negative  Eyes: Negative  Respiratory: Negative  Cardiovascular: Negative  Gastrointestinal: Negative  Endocrine: Negative  Genitourinary: Negative  Skin: Negative  Allergic/Immunologic: Negative  Neurological: Positive for weakness and numbness  Hematological: Negative  Psychiatric/Behavioral: Negative              Objective:     Ortho ExamPhysical Exam Constitutional: She is oriented to person, place, and time  She appears well-developed and well-nourished  HENT:   Head: Normocephalic and atraumatic  Eyes: Pupils are equal, round, and reactive to light  Conjunctivae are normal    Cardiovascular: Normal rate and regular rhythm  Pulmonary/Chest: Effort normal  No respiratory distress  Neurological: She is alert and oriented to person, place, and time  No cranial nerve deficit  Skin: Skin is warm and dry  No erythema  Psychiatric: She has a normal mood and affect  Her behavior is normal  Judgment and thought content normal    Nursing note and vitals reviewed  Lumbar spine exam:  Tender to palpation at the L4-L5 and the L5-S1 levels  Some left SI joint tenderness noted  Also has some tenderness over the left gluteus medius and trochanteric bursa  SLR positive on the left  SLR negative on the right  Hypoesthesia to light touch in the left L5 and S1 dermatomal distribution  Lower extremity motor strength testing reveals left extensor hallucis longus as well as flexor hallucis longus weakness  Left side EHL strength is 3/5, FHL strength is 4/5  Unable to actively dorsiflex left ankle  2+ bilateral knee deep tendon reflex  Diminished left ankle reflex  Left hip exam:  Good range of left hip motion with negative logroll  Does have some tenderness over the trochanteric bursa  HENRIETTA equivocal, negative FADIIR  Negative Wilson test     Left knee exam:  Full range of left knee motion with minimal crepitus and no significant discomfort  Negative medial and lateral Romeo's  Negative Lachman  Negative valgus and varus stress test   Right knee exam:  Full range of right knee motion without any significant crepitus or discomfort  Negative medial lateral Romeo's  Negative Lachman    Negative valgus and varus stress test   I have personally reviewed pertinent films in PACS and my interpretation is Plain radiograph of the lumbar spine done today reveals significant multilevel degenerative disc disease of the lumbar spine without any acute fracture or dislocation  Zarina Erick

## 2019-08-08 NOTE — PROGRESS NOTES
Assessment:       1  Left lumbar radiculopathy    2  Osteoarthritis of left knee, unspecified osteoarthritis type    3  Ankle weakness    4  Lumbar spine pain          Plan:        I explained my current clinical findings and reviewed radiological findings with [facility]  I suspect a significant left-sided L5 and/or S1 root compression which is likely the cause of her left lower extremity symptoms  Due to associated motor weakness, I will request an MRI of her lumbar spine for further evaluation  In the interim I will recommend using a MAFO brace on the left ankle/foot to help with her ambulation and decrease of fall risk  If her lumbar spine MRI does reveal any significant root compression, we will consider doing a referral to pain management for trial of lumbar epidural cortisone injection for her current symptoms  Subjective:     Patient ID: Tonja Bridges is a 69 y o  female  Chief Complaint:  Left hip and leg weakness and pain    HPI  [facility] is a pleasant 16-year-old lady who is here today for evaluation of left-sided posterior hip pain and bilateral knee pain  Her symptoms started with left posterior hip pain that radiates down her left lower extremity  Symptoms have been present for several months without any preceding trauma  Describes most pain on the lateral and posterolateral aspect of the left hip that radiates down to her left knee and intermittently all the way down her left foot  This leads to ambulatory dysfunction and then she started developing some right knee pain as well she has noticed tingling and numbness as well as weakness of her left ankle and foot  She does report having a sense of instability and tripping over left foot while walking  She currently ambulates with the help of her walker  She denies any bowel or bladder control problems or recent sudden loss of weight or appetite  She also denies any systemic symptoms like fever or chills    She was earlier seen by her primary care physician and had a plain radiograph of the left hip as well as the left knee in April 2019  The left knee radiograph revealed moderate narrowing of the lateral tibia fibular joint space indicative of some degenerative arthritic changes  Hip radiograph revealed minor degenerative changes of both the hips with symmetric joint spaces  Past Medical History:   Diagnosis Date    Abdominal enlargement     Amenorrhea     Back pain     Chronic diarrhea     Diabetes mellitus (HCC)     GI bleeding     Gout     Hypercholesteremia     Hypertension     Kidney lesion     Obesity           Social History     Occupational History    Not on file   Tobacco Use    Smoking status: Current Every Day Smoker    Smokeless tobacco: Never Used    Tobacco comment: NEVER SMOKER AS PER [employer]   Substance and Sexual Activity    Alcohol use: Yes     Comment: rarely; NO ALCOHOL USE AS PER [employer]TS    Drug use: No    Sexual activity: Not on file      Review of Systems   Constitutional: Negative  HENT: Negative  Eyes: Negative  Respiratory: Negative  Cardiovascular: Negative  Gastrointestinal: Negative  Endocrine: Negative  Genitourinary: Negative  Skin: Negative  Allergic/Immunologic: Negative  Neurological: Positive for weakness and numbness  Hematological: Negative  Psychiatric/Behavioral: Negative  Objective:     Ortho ExamPhysical Exam   Constitutional: She is oriented to person, place, and time  She appears well-developed and well-nourished  HENT:   Head: Normocephalic and atraumatic  Eyes: Pupils are equal, round, and reactive to light  Conjunctivae are normal    Cardiovascular: Normal rate and regular rhythm  Pulmonary/Chest: Effort normal  No respiratory distress  Neurological: She is alert and oriented to person, place, and time  No cranial nerve deficit  Skin: Skin is warm and dry  No erythema  Psychiatric: She has a normal mood and affect  Her behavior is normal  Judgment and thought content normal    Nursing note and vitals reviewed  Lumbar spine exam:  Tender to palpation at the L4-L5 and the L5-S1 levels  Some left SI joint tenderness noted  Also has some tenderness over the left gluteus medius and trochanteric bursa  SLR positive on the left  SLR negative on the right  Hypoesthesia to light touch in the left L5 and S1 dermatomal distribution  Lower extremity motor strength testing reveals left extensor hallucis longus as well as flexor hallucis longus weakness  Left side EHL strength is 3/5, FHL strength is 4/5  Unable to actively dorsiflex left ankle  2+ bilateral knee deep tendon reflex  Diminished left ankle reflex  Left hip exam:  Good range of left hip motion with negative logroll  Does have some tenderness over the trochanteric bursa  HENRIETTA equivocal, negative FADIIR  Negative Wilson test     Left knee exam:  Full range of left knee motion with minimal crepitus and no significant discomfort  Negative medial and lateral Romeo's  Negative Lachman  Negative valgus and varus stress test   Right knee exam:  Full range of right knee motion without any significant crepitus or discomfort  Negative medial lateral Romeo's  Negative Lachman  Negative valgus and varus stress test   I have personally reviewed pertinent films in PACS and my interpretation is Plain radiograph of the lumbar spine done today reveals significant multilevel degenerative disc disease of the lumbar spine without any acute fracture or dislocation  Stephanie Garzon

## 2019-08-19 NOTE — TELEPHONE ENCOUNTER
Lm for pt to call back and schedule appt
Patient called voice mail for refill for Gabapentin, Please refill if appropriate  Please send to [facility] [place] 
Please call patient
Please have patient scheduled appointment, her last visit for diabetes follow-up was in July    Thank you
No

## 2019-08-21 ENCOUNTER — HOSPITAL ENCOUNTER (OUTPATIENT)
Dept: RADIOLOGY | Facility: HOSPITAL | Age: 74
Discharge: HOME | End: 2019-08-21
Attending: ORTHOPAEDIC SURGERY
Payer: COMMERCIAL

## 2019-08-21 DIAGNOSIS — M54.16 LEFT LUMBAR RADICULOPATHY: ICD-10-CM

## 2019-08-21 PROCEDURE — 72148 MRI LUMBAR SPINE W/O DYE: CPT

## 2019-08-27 ENCOUNTER — OFFICE VISIT (OUTPATIENT)
Dept: FAMILY MEDICINE CLINIC | Facility: CLINIC | Age: 74
End: 2019-08-27

## 2019-08-27 VITALS
BODY MASS INDEX: 30.32 KG/M2 | HEART RATE: 78 BPM | TEMPERATURE: 97.1 F | DIASTOLIC BLOOD PRESSURE: 90 MMHG | RESPIRATION RATE: 16 BRPM | SYSTOLIC BLOOD PRESSURE: 110 MMHG | WEIGHT: 160.6 LBS | HEIGHT: 61 IN

## 2019-08-27 DIAGNOSIS — R26.2 AMBULATORY DYSFUNCTION: ICD-10-CM

## 2019-08-27 DIAGNOSIS — M51.16 LUMBAR DISC PROLAPSE WITH COMPRESSION RADICULOPATHY: ICD-10-CM

## 2019-08-27 DIAGNOSIS — Z00.00 PREVENTATIVE HEALTH CARE: ICD-10-CM

## 2019-08-27 DIAGNOSIS — E78.00 HYPERCHOLESTEREMIA: ICD-10-CM

## 2019-08-27 DIAGNOSIS — I10 HYPERTENSION, UNSPECIFIED TYPE: ICD-10-CM

## 2019-08-27 DIAGNOSIS — N95.1 MENOPAUSAL STATE: ICD-10-CM

## 2019-08-27 DIAGNOSIS — E11.40 TYPE 2 DIABETES MELLITUS WITH DIABETIC NEUROPATHY, WITHOUT LONG-TERM CURRENT USE OF INSULIN (HCC): Primary | ICD-10-CM

## 2019-08-27 DIAGNOSIS — E11.8 TYPE 2 DIABETES MELLITUS WITH COMPLICATION (HCC): ICD-10-CM

## 2019-08-27 DIAGNOSIS — R52 PAIN: ICD-10-CM

## 2019-08-27 DIAGNOSIS — M79.605 LEFT LEG PAIN: ICD-10-CM

## 2019-08-27 DIAGNOSIS — M17.12 OSTEOARTHRITIS OF LEFT KNEE, UNSPECIFIED OSTEOARTHRITIS TYPE: ICD-10-CM

## 2019-08-27 PROCEDURE — 99213 OFFICE O/P EST LOW 20 MIN: CPT | Performed by: FAMILY MEDICINE

## 2019-08-27 RX ORDER — ASPIRIN 81 MG/1
81 TABLET ORAL DAILY
Qty: 90 TABLET | Refills: 1 | Status: SHIPPED | OUTPATIENT
Start: 2019-08-27 | End: 2020-02-14

## 2019-08-27 RX ORDER — GABAPENTIN 300 MG/1
300 CAPSULE ORAL 3 TIMES DAILY
Qty: 270 CAPSULE | Refills: 0 | Status: SHIPPED | OUTPATIENT
Start: 2019-08-27 | End: 2019-12-01 | Stop reason: SDUPTHER

## 2019-08-27 NOTE — ASSESSMENT & PLAN NOTE
- Patient is followed by Dr Shon Bradshaw [facility],  f/u appointment scheduled for 8/28/19  - Patient had an MRI on 21st of this month     - Dr Hannah Frankel will go over MRI results and pain medication options for patient

## 2019-08-27 NOTE — PROGRESS NOTES
Assessment/Plan     Diabetes mellitus with neurological manifestations ([place] [address] )  Lab Results   Component Value Date    HGBA1C 6 3 07/30/2018       No results for input(s): POCGLU in the last 72 hours  None    Blood Sugar Average: Last 72 hrs:     Previously on Metformin 1000mg bid, given improved A1c will decrease metformin to 1000mg daily   Will recheck a1c in 3 months, if stable will take her off metformin   Continue gabapentin 300mg tid for Neuropathy    Hypertension  Stable off of medication, will continue to monitor     Left leg pain  Continue follow up with Sports Medicine as advised     Ambulatory dysfunction  Currently uses roller walker  referral given for [facility] for motorized walker    Diagnoses and all orders for this visit:    Type 2 diabetes mellitus with diabetic neuropathy, without long-term current use of insulin ([place] [address] )    Type 2 diabetes mellitus with complication ([place] [address] )  -     metFORMIN (GLUCOPHAGE) 1000 MG tablet; Take 1 tablet (1,000 mg total) by mouth daily with breakfast  -     Comprehensive metabolic panel; Future  -     Lipid panel; Future  -     Microalbumin / creatinine urine ratio    Pain  -     gabapentin (NEURONTIN) 300 mg capsule; Take 1 capsule (300 mg total) by mouth 3 (three) times a day for 90 days    Menopausal state  -     Calcium Carbonate-Vitamin D3 (RA CALCIUM 600/VITAMIN D-3) 600-400 MG-UNIT TABS; Take 1 tablet by mouth 2 (two) times a day    Hypertension, unspecified type  -     CBC and differential; Future    Hypercholesteremia  -     Lipid panel;  Future    Ambulatory dysfunction  -     Wheelchair    Osteoarthritis of left knee, unspecified osteoarthritis type  -     Wheelchair    Left leg pain  -     Wheelchair    Lumbar disc prolapse with compression radiculopathy  -     Wheelchair         Subjective     Chief Complaint   Patient presents with    Hip Pain    Knee Pain       HPI  70 y/o female, who is here to f/u about her chronic osteoartharitis of left knee and left leg pain  Chronic Pain: She had an MRI that showed, root irritation of L5, she reports that she has pain that travels down the left hip and knee  The pain for the patient is more than a 10/10 and burning in nature  She reports pain in her right knee as well, which is a 10/10  She will follow up with Dr Abhilash Wu, tomorrow on Aug 28th  Today's Concerns: She wants refills for rest of medications   The following portions of the patient's history were reviewed and updated as appropriate: allergies, current medications, past family history, past medical history, past social history, past surgical history and problem list     Review of Systems   Constitutional: Negative for appetite change, chills and fever  HENT: Negative for congestion  Respiratory: Negative for cough, chest tightness, shortness of breath and wheezing  Cardiovascular: Negative for chest pain  Gastrointestinal: Negative for abdominal pain and blood in stool  Genitourinary: Negative for hematuria  Musculoskeletal: Positive for arthralgias, gait problem and joint swelling  Neurological: Negative for seizures and headaches  Objective     Vitals:Blood pressure 110/90, pulse 78, temperature (!) 97 1 °F (36 2 °C), resp  rate 16, height 5' 0 9" (1 547 m), weight 72 8 kg (160 lb 9 6 oz)  Physical Exam:  Physical Exam   Constitutional: She is oriented to person, place, and time  She appears well-developed and well-nourished  HENT:   Head: Normocephalic and atraumatic  Eyes: Conjunctivae and EOM are normal    Neck: Normal range of motion  Neck supple  Cardiovascular: Normal rate, regular rhythm and normal heart sounds  Exam reveals no friction rub  No murmur heard  Pulmonary/Chest: Effort normal and breath sounds normal  No respiratory distress  She has no wheezes  She has no rales  Abdominal: Soft  Bowel sounds are normal    Musculoskeletal: She exhibits edema     ambulates with help of roller walker   Neurological: She is alert and oriented to person, place, and time  Skin: Skin is warm and dry

## 2019-08-27 NOTE — ASSESSMENT & PLAN NOTE
Lab Results   Component Value Date    HGBA1C 6 3 07/30/2018       No results for input(s): POCGLU in the last 72 hours   None    Blood Sugar Average: Last 72 hrs:     Previously on Metformin 1000mg bid, given improved A1c will decrease metformin to 1000mg daily   Will recheck a1c in 3 months, if stable will take her off metformin   Continue gabapentin 300mg tid for Neuropathy

## 2019-08-28 ENCOUNTER — OFFICE VISIT (OUTPATIENT)
Dept: OBGYN CLINIC | Facility: HOSPITAL | Age: 74
End: 2019-08-28
Payer: COMMERCIAL

## 2019-08-28 ENCOUNTER — LAB (OUTPATIENT)
Dept: LAB | Facility: HOSPITAL | Age: 74
End: 2019-08-28
Payer: COMMERCIAL

## 2019-08-28 VITALS
BODY MASS INDEX: 30.3 KG/M2 | DIASTOLIC BLOOD PRESSURE: 70 MMHG | HEART RATE: 60 BPM | SYSTOLIC BLOOD PRESSURE: 133 MMHG | WEIGHT: 160.5 LBS | HEIGHT: 61 IN

## 2019-08-28 DIAGNOSIS — M17.12 OSTEOARTHRITIS OF LEFT KNEE, UNSPECIFIED OSTEOARTHRITIS TYPE: ICD-10-CM

## 2019-08-28 DIAGNOSIS — M54.16 LEFT LUMBAR RADICULOPATHY: Primary | ICD-10-CM

## 2019-08-28 DIAGNOSIS — R29.898 WEAKNESS OF LEFT FOOT: ICD-10-CM

## 2019-08-28 DIAGNOSIS — I10 HYPERTENSION, UNSPECIFIED TYPE: ICD-10-CM

## 2019-08-28 DIAGNOSIS — E11.8 TYPE 2 DIABETES MELLITUS WITH COMPLICATION (HCC): ICD-10-CM

## 2019-08-28 DIAGNOSIS — E78.00 HYPERCHOLESTEREMIA: ICD-10-CM

## 2019-08-28 DIAGNOSIS — E11.8 TYPE 2 DIABETES MELLITUS WITH COMPLICATION, WITHOUT LONG-TERM CURRENT USE OF INSULIN (HCC): ICD-10-CM

## 2019-08-28 LAB
ALBUMIN SERPL BCP-MCNC: 4 G/DL (ref 3.5–5)
ALP SERPL-CCNC: 86 U/L (ref 46–116)
ALT SERPL W P-5'-P-CCNC: 15 U/L (ref 12–78)
ANION GAP SERPL CALCULATED.3IONS-SCNC: 9 MMOL/L (ref 4–13)
AST SERPL W P-5'-P-CCNC: 15 U/L (ref 5–45)
BASOPHILS # BLD AUTO: 0.02 THOUSANDS/ΜL (ref 0–0.1)
BASOPHILS NFR BLD AUTO: 0 % (ref 0–1)
BILIRUB SERPL-MCNC: 0.34 MG/DL (ref 0.2–1)
BUN SERPL-MCNC: 40 MG/DL (ref 5–25)
CALCIUM SERPL-MCNC: 9.6 MG/DL (ref 8.3–10.1)
CHLORIDE SERPL-SCNC: 114 MMOL/L (ref 100–108)
CHOLEST SERPL-MCNC: 254 MG/DL (ref 50–200)
CO2 SERPL-SCNC: 22 MMOL/L (ref 21–32)
CREAT SERPL-MCNC: 1.85 MG/DL (ref 0.6–1.3)
CREAT UR-MCNC: 253 MG/DL
EOSINOPHIL # BLD AUTO: 0.04 THOUSAND/ΜL (ref 0–0.61)
EOSINOPHIL NFR BLD AUTO: 1 % (ref 0–6)
ERYTHROCYTE [DISTWIDTH] IN BLOOD BY AUTOMATED COUNT: 13.2 % (ref 11.6–15.1)
EST. AVERAGE GLUCOSE BLD GHB EST-MCNC: 137 MG/DL
GFR SERPL CREATININE-BSD FRML MDRD: 27 ML/MIN/1.73SQ M
GLUCOSE P FAST SERPL-MCNC: 102 MG/DL (ref 65–99)
HBA1C MFR BLD: 6.4 % (ref 4.2–6.3)
HCT VFR BLD AUTO: 31.4 % (ref 34.8–46.1)
HDLC SERPL-MCNC: 35 MG/DL (ref 40–60)
HGB BLD-MCNC: 10.6 G/DL (ref 11.5–15.4)
IMM GRANULOCYTES # BLD AUTO: 0.02 THOUSAND/UL (ref 0–0.2)
IMM GRANULOCYTES NFR BLD AUTO: 0 % (ref 0–2)
LDLC SERPL CALC-MCNC: 180 MG/DL (ref 0–100)
LYMPHOCYTES # BLD AUTO: 1.62 THOUSANDS/ΜL (ref 0.6–4.47)
LYMPHOCYTES NFR BLD AUTO: 21 % (ref 14–44)
MCH RBC QN AUTO: 32.2 PG (ref 26.8–34.3)
MCHC RBC AUTO-ENTMCNC: 33.8 G/DL (ref 31.4–37.4)
MCV RBC AUTO: 95 FL (ref 82–98)
MICROALBUMIN UR-MCNC: 109 MG/L (ref 0–20)
MICROALBUMIN/CREAT 24H UR: 43 MG/G CREATININE (ref 0–30)
MONOCYTES # BLD AUTO: 0.32 THOUSAND/ΜL (ref 0.17–1.22)
MONOCYTES NFR BLD AUTO: 4 % (ref 4–12)
NEUTROPHILS # BLD AUTO: 5.85 THOUSANDS/ΜL (ref 1.85–7.62)
NEUTS SEG NFR BLD AUTO: 74 % (ref 43–75)
NONHDLC SERPL-MCNC: 219 MG/DL
NRBC BLD AUTO-RTO: 0 /100 WBCS
PLATELET # BLD AUTO: 274 THOUSANDS/UL (ref 149–390)
PMV BLD AUTO: 10.2 FL (ref 8.9–12.7)
POTASSIUM SERPL-SCNC: 4.3 MMOL/L (ref 3.5–5.3)
PROT SERPL-MCNC: 7 G/DL (ref 6.4–8.2)
RBC # BLD AUTO: 3.29 MILLION/UL (ref 3.81–5.12)
SODIUM SERPL-SCNC: 145 MMOL/L (ref 136–145)
TRIGL SERPL-MCNC: 197 MG/DL
WBC # BLD AUTO: 7.87 THOUSAND/UL (ref 4.31–10.16)

## 2019-08-28 PROCEDURE — 82043 UR ALBUMIN QUANTITATIVE: CPT | Performed by: FAMILY MEDICINE

## 2019-08-28 PROCEDURE — 80061 LIPID PANEL: CPT

## 2019-08-28 PROCEDURE — 99213 OFFICE O/P EST LOW 20 MIN: CPT | Performed by: ORTHOPAEDIC SURGERY

## 2019-08-28 PROCEDURE — 80053 COMPREHEN METABOLIC PANEL: CPT

## 2019-08-28 PROCEDURE — 83036 HEMOGLOBIN GLYCOSYLATED A1C: CPT

## 2019-08-28 PROCEDURE — 82570 ASSAY OF URINE CREATININE: CPT | Performed by: FAMILY MEDICINE

## 2019-08-28 PROCEDURE — 36415 COLL VENOUS BLD VENIPUNCTURE: CPT

## 2019-08-28 PROCEDURE — 3060F POS MICROALBUMINURIA REV: CPT | Performed by: FAMILY MEDICINE

## 2019-08-28 PROCEDURE — 85025 COMPLETE CBC W/AUTO DIFF WBC: CPT

## 2019-08-28 NOTE — PROGRESS NOTES
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 and is currently not interested in any surgical intervention  Additionally, given her advanced age and comorbid conditions, she is not an optimal surgical candidate  Hence, I will refer her to pain management for further management of her lumbar radiculopathy and potential epidural cortisone injection of the lumbar spine  Today, I again provided her with information and gave her a script for modified ankle-foot arthrosis for the left side to help improve her ambulatory dysfunction and decrease a fall risk  We will see her back in the future on an as-needed basis  Subjective:     Patient ID: Du García is a 69 y o  female  Chief Complaint:  Follow-up low back pain    HPI    \[facility]\"" is here today for a follow-up of her low back pain and left lower extremity radicular pain  Since her last office visit, she has had an MRI of her lumbar spine which reveals:    "Left convex L2-3 apex scoliosis narrows the right foramen, correlate for right L2 radiculitis      Asymmetric leftward protrusion of disc material L4-5 narrowing the lateral recess and to a lesser degree neural foramen  Correlate for left L5 radiculitis      Far left lateral L5-S1 disc in contact with foraminal/post foraminal L5 root, representing a 2nd potential site for left L5 root irritation  "     At the last office visit, she was advised to get a left MAFO brace to help with her left ankle and foot weakness  However, she has not yet received the same    She is still ambulating with the help of a walker and her main concern today is her low back pain and left lower extremity paresthesia/pain  Denies any new onset bowel or bladder control problems  She lives at home with her daughter 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  Social History     Occupational History    Not on file   Tobacco Use    Smoking status: Current Every Day Smoker    Smokeless tobacco: Never Used    Tobacco comment: NEVER SMOKER AS PER [employer]   Substance and Sexual Activity    Alcohol use: Yes     Comment: rarely; NO ALCOHOL USE AS PER [employer]SCRIPTS    Drug use: No    Sexual activity: Not on file      Review of Systems   Constitutional: Negative  Some weight loss   HENT: Negative  Eyes: Negative  Respiratory: Negative  Cardiovascular: Negative  Gastrointestinal: Negative  Endocrine: Negative  Genitourinary: Negative  Skin: Negative  Allergic/Immunologic: Negative  Neurological: Positive for weakness, numbness and headaches  Hematological: Negative  Psychiatric/Behavioral: Negative  Objective:     Ortho ExamPhysical Exam   Constitutional: She is oriented to person, place, and time  She appears well-developed and well-nourished  HENT:   Head: Normocephalic and atraumatic  Eyes: Pupils are equal, round, and reactive to light  Conjunctivae are normal    Cardiovascular: Normal rate and regular rhythm  Pulmonary/Chest: Effort normal  No respiratory distress  Neurological: She is alert and oriented to person, place, and time  No cranial nerve deficit  Skin: Skin is warm and dry  No erythema  Psychiatric: She has a normal mood and affect  Her behavior is normal  Judgment and thought content normal    Nursing note and vitals reviewed  Lumbar spine exam:  Tender to palpation at the L4-L5 and the L5-S1 levels  Some left SI joint tenderness noted  Also has some tenderness over the left gluteus medius and trochanteric bursa  SLR positive on the left    SLR negative on the right  Hypoesthesia to light touch in the left L5 and S1 dermatomal distribution  Lower extremity motor strength testing reveals left extensor hallucis longus as well as flexor hallucis longus weakness  Left side EHL strength is 3/5, FHL strength is 4/5  Unable to actively dorsiflex left ankle  2+ bilateral knee deep tendon reflex  Diminished left ankle reflex  I have personally reviewed pertinent films in PACS and my interpretation is As noted in the HPI

## 2019-09-09 NOTE — RESULT ENCOUNTER NOTE
Pt has very high LDL and her kidney function is worsening   Please have her schedule visit to discuss results

## 2019-09-10 ENCOUNTER — TELEPHONE (OUTPATIENT)
Dept: FAMILY MEDICINE CLINIC | Facility: CLINIC | Age: 74
End: 2019-09-10

## 2019-09-10 NOTE — TELEPHONE ENCOUNTER
----- Message from Mary Santamaria RN sent at 9/10/2019  9:37 AM EDT -----  Please have patient schedule an appt to review labs   Thank you1

## 2019-09-10 NOTE — TELEPHONE ENCOUNTER
TOÑO scheduled pt 9/26 with Dr Deniz Aviles pt refused all sooner appts due to transportation and having to do things with family she says

## 2019-10-01 ENCOUNTER — OFFICE VISIT (OUTPATIENT)
Dept: PAIN MEDICINE | Facility: CLINIC | Age: 74
End: 2019-10-01
Payer: COMMERCIAL

## 2019-10-01 VITALS
WEIGHT: 161 LBS | BODY MASS INDEX: 31.61 KG/M2 | DIASTOLIC BLOOD PRESSURE: 76 MMHG | HEIGHT: 60 IN | SYSTOLIC BLOOD PRESSURE: 124 MMHG | HEART RATE: 68 BPM | RESPIRATION RATE: 18 BRPM

## 2019-10-01 DIAGNOSIS — G89.29 CHRONIC LOW BACK PAIN WITH SCIATICA, SCIATICA LATERALITY UNSPECIFIED, UNSPECIFIED BACK PAIN LATERALITY: ICD-10-CM

## 2019-10-01 DIAGNOSIS — M47.816 LUMBAR SPONDYLOSIS: Primary | ICD-10-CM

## 2019-10-01 DIAGNOSIS — M54.40 CHRONIC LOW BACK PAIN WITH SCIATICA, SCIATICA LATERALITY UNSPECIFIED, UNSPECIFIED BACK PAIN LATERALITY: ICD-10-CM

## 2019-10-01 PROCEDURE — 99204 OFFICE O/P NEW MOD 45 MIN: CPT | Performed by: PHYSICAL MEDICINE & REHABILITATION

## 2019-10-01 NOTE — PROGRESS NOTES
Assessment  1  Lumbar spondylosis    2  Chronic low back pain with sciatica, sciatica laterality unspecified, unspecified back pain laterality        Plan  Ms Nat Craig is a pleasant 69-year-old female with greater than 10 years history of low back pain unrelieved with conservative measures including therapy, over-the-counter NSAIDs and relative rest   She is demonstrating both diagnostic and clinical evidence of lumbar spondylosis and has advanced facet arthrosis at multiple levels of the lumbar spine  I believe she would greatly benefit from medial branch blocks and possible RFA  She is already on gabapentin at 300 mg at night and physical therapy is provided minimal relief  At this time we will  1  We will schedule the patient for bilateral L3, L4, L5 medial branch nerve blocks with intention of moving forward towards radiofrequency ablation if there is an appropriate diagnostic response  The initial blocks will be performed with 0 25% bupivacaine and if an appropriate response is obtained upon review of the patient's pain diary, a confirmatory block will be scheduled with 0 75% bupivacaine  In the office today, we reviewed the nature of facet joint pathology in depth using a spine model  We discussed the approach we would use for the injections and provided literature for home review  The patient understands the risks associated with the procedure including bleeding, infection, tissue injury, and allergic reaction and provided verbal informed consent in the office today  My impressions and treatment recommendations were discussed in detail with the patient who verbalized understanding and had no further questions  Discharge instructions were provided  I personally saw and examined the patient and I agree with the above discussed plan of care      Orders Placed This Encounter   Procedures    FL spine and pain procedure     Standing Status:   Future     Standing Expiration Date:   10/1/2023     Order Specific Question:   Reason for Exam:     Answer:   Right and left-sided L3, L4, L5 medial branch blocks trial #1     Order Specific Question:   Anticoagulant hold needed? Answer:   No     No orders of the defined types were placed in this encounter  History of Present Illness    Anabela Matute is a 69 y o  female presents to [place] [address] and [facility] with complaints of 10 years duration of low back pain  Patient reports pain started gradually with no specific inciting event  She now reports the pain to be moderate to severe rated 10/10 and interfering with her daily activities  Pain is constant 100% of the time and worse throughout the day  She describes the pain as shooting, sharp, pins and needles, pressure-like, throbbing, dull and aching  Also complains of intermittent lower extremity weakness in the left leg  She uses a walker for ambulation  Pain is worse with standing, bending, walking, coughing, sneezing  The pain is unrelieved with physical therapy and heat or ice  In past she has received tramadol, acetaminophen, ibuprofen, Valium, oral steroids, Lyrica none of which have provided significant relief  She is following with orthopedic surgery whom have determine she is likely not a surgical candidate due to her advanced age and comorbid conditions  An MRI and x-ray of the lumbar spine were done with results below  Presents today for evaluation of chronic low back pain  I have personally reviewed and/or updated the patient's past medical history, past surgical history, family history, social history, current medications, allergies, and vital signs today  Review of Systems   Constitutional: Positive for unexpected weight change  Negative for fever  HENT: Negative for trouble swallowing  Eyes: Negative for visual disturbance  Respiratory: Positive for cough  Negative for shortness of breath and wheezing  Cardiovascular: Positive for leg swelling   Negative for chest pain and palpitations  Gastrointestinal: Negative for constipation, diarrhea, nausea and vomiting  Endocrine: Negative for cold intolerance, heat intolerance and polydipsia  Genitourinary: Negative for difficulty urinating and frequency  Musculoskeletal: Positive for joint swelling and myalgias  Negative for arthralgias and gait problem  Skin: Negative for rash  Neurological: Negative for dizziness, seizures, syncope, weakness and headaches  Hematological: Does not bruise/bleed easily  Psychiatric/Behavioral: Negative for dysphoric mood  All other systems reviewed and are negative  Patient Active Problem List   Diagnosis    Diabetes mellitus with neurological manifestations (HCC)    Hypercholesteremia    Hypertension    Chronic diarrhea    Left leg pain    Osteoarthritis of left knee    Ambulatory dysfunction       Past Medical History:   Diagnosis Date    Abdominal enlargement     Amenorrhea     Back pain     Chronic diarrhea     Diabetes mellitus ([address] )     GI bleeding     Gout     Hypercholesteremia     Hypertension     Kidney lesion     Obesity        Past Surgical History:   Procedure Laterality Date    APPENDECTOMY      [place] ESOPHAGOGASTRODUODENOSCOPY TRANSORAL DIAGNOSTIC N/A 1/22/2018    Procedure: EGD AND COLONOSCOPY;  Surgeon: Reema Bowman MD;  [facility];   Service: Gastroenterology       Family History   Problem Relation Age of Onset    Diabetes Mother         MELLITUS    Diabetes Son         MELLITUS       Social History     Occupational History    Not on file   Tobacco Use    Smoking status: Current Every Day Smoker    Smokeless tobacco: Never Used    Tobacco comment: NEVER SMOKER AS PER [employer]   Substance and Sexual Activity    Alcohol use: Yes     Comment: rarely; NO ALCOHOL USE AS PER [employer]TS    Drug use: No    Sexual activity: Not on file       Current Outpatient Medications on File Prior to Visit   Medication Sig    aspirin (SM ASPIRIN ADULT LOW STRENGTH) 81 mg EC tablet Take 1 tablet (81 mg total) by mouth daily    Calcium Carbonate-Vitamin D3 (RA CALCIUM 600/VITAMIN D-3) 600-400 MG-UNIT TABS Take 1 tablet by mouth 2 (two) times a day    gabapentin (NEURONTIN) 300 mg capsule Take 1 capsule (300 mg total) by mouth 3 (three) times a day for 90 days    levocetirizine (XYZAL) 5 MG tablet Take 1 tablet (5 mg total) by mouth daily    metFORMIN (GLUCOPHAGE) 1000 MG tablet Take 1 tablet (1,000 mg total) by mouth daily with breakfast    pantoprazole (PROTONIX) 40 mg tablet take 1 tablet by mouth daily     No current facility-administered medications on file prior to visit  No Known Allergies    Physical Exam    /76   Pulse 68   Resp 18   Ht 5' (1 524 m)   Wt 73 kg (161 lb)   BMI 31 44 kg/m²     General: Well-developed, well-nourished individual in no acute distress  Mental: Appropriate mood and affect  Grossly oriented with coherent speech and thought processing  Neuro:  Cranial nerves: Cranial nerve function is grossly intact bilaterally  Strength: Bilateral lower extremity strength is normal and symmetric  No atrophy or tone abnormalities noted  Reflexes: Bilateral lower extremity muscle stretch reflexes are physiologic and symmetric  No ankle clonus is noted  Sensation:  Decreased sensation to light touch over the left lower extremity both anterior 0 lateral and posterior laterally  SLR/Foraminal Compression Maneuvers: Straight leg raising in the   supine position is positive  for radicular pain left lower extremity  Gait:  Gait/gross motor: Gait is antalgic  Station is forward flexed posture  Toe walking, heel walking  are unable to perform secondary to pain in the low back      Musculoskeletal:  Palpation: Inspection and palpation of the spine and extremities are exquisitely tender over the lumbar paraspinals bilaterally  POSITIVE pain over lumbar facets with axial loading and rotational components both left and right-sided lumbar spine  Alma Colby Spine:  Decreased active and passive range of motion of the lumbar spine with flexion and extension  No gross axial skeletal deformities  Skin: Skin inspection grossly negative for erythema, breakdown, or concerning lesions in affected area  Lymph: No lymphadenopathy is appreciated in the involved extremity  Vessels: No lower extremity edema  Lungs: Breathing is comfortable and regular  No dyspnea noted during examination  Eyes: Visual field grossly intact to confrontation  No redness appreciated  ENT: No craniofacial deformities or asymmetry  No neck masses appreciated  Imaging Lumbar MRI dated 8/21/19: L1-L2:  Marginal osteophytes, circumferential bulge and moderate facet arthrosis      L2-L3:  Asymmetric disc osteophyte far lateral to the right, moderately severe foraminal stenosis  Disc in contact with post foraminal right L2 root  Correlate for right L2 radiculitis  Asymmetric right facet arthrosis, circumferential bulge, no   critical canal stenosis      L3-L4:  Reduction disc height, circumferential bulge, advanced bilateral facet arthrosis  Leftward translation of L3, no critical stenosis      L4-L5:  Moderately advanced bilateral facet arthrosis, redundancy ligamenta flava, asymmetric to the left circumferential disc extends asymmetrically to the left with the moderate narrowing of the foramen and moderately severe narrowing of the lateral   recess  Correlate for left L5 radiculitis      L5-S1:  Tiny central annular fissure broad-based posterior protrusion, disc extends asymmetrically into the left foramen in contact with the foraminal and post foraminal L5 root  This is another potential source for L5 root irritation  Marginal   osteophytes, moderate bilateral facet arthrosis    S1 roots normal      IMPRESSION:     Left convex L2-3 apex scoliosis narrows the right foramen, correlate for right L2 radiculitis      Asymmetric leftward protrusion of disc material L4-5 narrowing the lateral recess and to a lesser degree neural foramen    Correlate for left L5 radiculitis      Far left lateral L5-S1 disc in contact with foraminal/post foraminal L5 root, representing a 2nd potential site for left L5 root irritation

## 2019-10-01 NOTE — PATIENT INSTRUCTIONS
Arthritis   WHAT YOU NEED TO KNOW:   What is arthritis? Arthritis is a disease that causes inflammation in one or more joints  There are many types of arthritis, such as osteoarthritis, rheumatoid arthritis, and septic arthritis  Some types cause inflammation in the joints  Other types wear away the cartilage between joints  This makes the bones of the joint rub together when you move the joint  Your symptoms may be constant, or symptoms may come and go  Arthritis often gets worse over time and can cause permanent joint damage  What increases my risk for arthritis? · A family history of arthritis    · Infection, trauma, or injury to the joint    · Obesity    · A disease such as diabetes, heart disease, hypothyroidism, or psoriasis    · An immune deficiency disorder, such as AIDS or lupus  What are the signs and symptoms of arthritis? · Pain, swelling, or stiffness in the joint    · Limited range of motion in the joint    · Warmth or redness over the joint    · Tenderness when you touch the joint    · Stiff joints in the morning that loosen with movement    · A creaking or grinding sound when you move the joint    · Fever  How is arthritis diagnosed? · Blood tests  are used to measure the amount of inflammation in your body  · An x-ray, CT, MRI, or ultrasound  may be used to check for joint damage, swelling, or loss of bone  Do not enter the MRI room with anything metal  Metal can cause serious damage  Tell the healthcare provider if you have any metal in or on your body  · A sample  of the fluid in the joint may be tested for uric acid or calcium crystals, or for signs of infection  How is arthritis treated? Treatment will depend on the type of arthritis you have and if it is severe  You may need any of the following:  · Acetaminophen  decreases pain and fever  It is available without a doctor's order  Ask how much to take and how often to take it  Follow directions   Acetaminophen can cause liver damage if not taken correctly  · NSAIDs , such as ibuprofen, help decrease swelling, pain, and fever  This medicine is available with or without a doctor's order  NSAIDs can cause stomach bleeding or kidney problems in certain people  If you take blood thinner medicine, always ask your healthcare provider if NSAIDs are safe for you  Always read the medicine label and follow directions  · Steroid medicine  helps reduce swelling and pain  · Surgery  may be needed to repair or replace a damaged joint  What can I do to manage arthritis? · Rest your painful joint so it can heal   Your healthcare provider may recommend crutches or a walker if the affected joint is in a leg  · Apply ice or heat to the joint  Both can help decrease swelling and pain  Ice may also help prevent tissue damage  Use an ice pack, or put crushed ice in a plastic bag  Cover it with a towel and place it on your joint for 15 to 20 minutes every hour or as directed  You can apply heat for 20 minutes every 2 hours  Heat treatment includes hot packs or heat lamps  · Elevate your joint  Elevation helps reduce swelling and pain  Raise your joint above the level of your heart as often as you can  Prop your painful joint on pillows to keep it above your heart comfortably  · Go to therapy as directed  A physical therapist can teach you exercises to improve flexibility and range of motion  You may also be shown non-weight-bearing exercises that are safe for your joints, such as swimming  Exercise can help keep your joints flexible and reduce pain  An occupational therapist can help you learn to do your daily activities when your joints are stiff or sore  · Maintain a healthy weight  Extra weight puts increased pressure on your joints  Ask your healthcare provider what you should weigh   If you need to lose weight, he can help you create a weight loss program  Weight loss can help reduce pain and increase your ability to do your activities  The amount of exercise you do may vary each day, depending on your symptoms  · Wear flat or low-heeled shoes  This will help decrease pain and reduce pressure on your ankle, knee, and hip joints  · Use support devices  You may be given splints to wear on your hands to help your joints rest and to decrease inflammation  While you sleep, use a pillow that is firm enough to support your neck and head  What other equipment should I use? The following may help you move and prevent falls:  · Orthotic shoes or insoles  help support your feet when you walk  · Crutches, a cane, or a walker  may help decrease your risk for falling  They also decrease stress on affected joints  · Devices to prevent falls  include raised toilet seats and bathtub bars to help you get up from sitting  Handrails can be placed in areas where you need balance and support  When should I seek immediate care? · You have a fever and severe joint pain or swelling  · You cannot move the affected joint  · You have severe joint pain you cannot tolerate  When should I contact my healthcare provider? · Your pain or swelling does not get better with treatment  · You have questions or concerns about your condition or care  CARE AGREEMENT:   You have the right to help plan your care  Learn about your health condition and how it may be treated  Discuss treatment options with your caregivers to decide what care you want to receive  You always have the right to refuse treatment  The above information is an  only  It is not intended as medical advice for individual conditions or treatments  Talk to your doctor, nurse or pharmacist before following any medical regimen to see if it is safe and effective for you  © 2017 [facility] Information is for End User's use only and may not be sold, redistributed or otherwise used for commercial purposes   All illustrations and images included in CareNotes® are the copyrighted property of A D A M [employer]  or Iraj Hobbs

## 2019-10-10 ENCOUNTER — OFFICE VISIT (OUTPATIENT)
Dept: FAMILY MEDICINE CLINIC | Facility: CLINIC | Age: 74
End: 2019-10-10

## 2019-10-10 VITALS
SYSTOLIC BLOOD PRESSURE: 122 MMHG | HEIGHT: 60 IN | WEIGHT: 157.8 LBS | HEART RATE: 80 BPM | DIASTOLIC BLOOD PRESSURE: 80 MMHG | TEMPERATURE: 98.4 F | RESPIRATION RATE: 18 BRPM | BODY MASS INDEX: 30.98 KG/M2

## 2019-10-10 DIAGNOSIS — N18.9 ANEMIA DUE TO CHRONIC KIDNEY DISEASE, UNSPECIFIED CKD STAGE: ICD-10-CM

## 2019-10-10 DIAGNOSIS — E11.8 TYPE 2 DIABETES MELLITUS WITH COMPLICATION (HCC): ICD-10-CM

## 2019-10-10 DIAGNOSIS — D63.1 ANEMIA DUE TO CHRONIC KIDNEY DISEASE, UNSPECIFIED CKD STAGE: ICD-10-CM

## 2019-10-10 DIAGNOSIS — E78.00 HYPERCHOLESTEREMIA: Primary | ICD-10-CM

## 2019-10-10 DIAGNOSIS — E11.40 TYPE 2 DIABETES MELLITUS WITH DIABETIC NEUROPATHY, WITHOUT LONG-TERM CURRENT USE OF INSULIN (HCC): ICD-10-CM

## 2019-10-10 PROCEDURE — 3008F BODY MASS INDEX DOCD: CPT | Performed by: FAMILY MEDICINE

## 2019-10-10 PROCEDURE — 99213 OFFICE O/P EST LOW 20 MIN: CPT | Performed by: FAMILY MEDICINE

## 2019-10-10 RX ORDER — DOCUSATE SODIUM 100 MG/1
100 CAPSULE, LIQUID FILLED ORAL 2 TIMES DAILY
Qty: 60 CAPSULE | Refills: 0 | Status: SHIPPED | OUTPATIENT
Start: 2019-10-10 | End: 2019-11-20 | Stop reason: SDUPTHER

## 2019-10-10 RX ORDER — FERROUS SULFATE TAB EC 324 MG (65 MG FE EQUIVALENT) 324 (65 FE) MG
324 TABLET DELAYED RESPONSE ORAL
Qty: 90 TABLET | Refills: 1 | Status: SHIPPED | OUTPATIENT
Start: 2019-10-10

## 2019-10-10 RX ORDER — ATORVASTATIN CALCIUM 40 MG/1
40 TABLET, FILM COATED ORAL DAILY
Qty: 90 TABLET | Refills: 1 | Status: SHIPPED | OUTPATIENT
Start: 2019-10-10 | End: 2020-03-21 | Stop reason: SDUPTHER

## 2019-10-11 PROBLEM — N18.30 STAGE 3 CHRONIC KIDNEY DISEASE (HCC): Status: ACTIVE | Noted: 2019-10-11

## 2019-10-11 PROBLEM — N18.2 STAGE 2 CHRONIC KIDNEY DISEASE: Status: ACTIVE | Noted: 2019-10-11

## 2019-10-11 NOTE — ASSESSMENT & PLAN NOTE
Lab Results   Component Value Date    HGBA1C 6 4 (H) 08/28/2019     Current regimen includes metformin 1000 mg b i d , A1c has improved, will decrease metformin to 1000 mg daily  Will recheck A1c in 3 months, if stable will further go down on metformin or discontinue it    Continue gabapentin 300 mg t i d

## 2019-10-11 NOTE — ASSESSMENT & PLAN NOTE
Creatinine 1 85, GFR 28 on most recent CMP  CKD likely secondary to hypertension and diabetes  Advised patient to avoid nephrotoxin medication    Will repeat CMP in 3 months

## 2019-10-11 NOTE — PROGRESS NOTES
Assessment/Plan     Diabetes mellitus with neurological manifestations (HCC)    Lab Results   Component Value Date    HGBA1C 6 4 (H) 08/28/2019     Current regimen includes metformin 1000 mg b i d , A1c has improved, will decrease metformin to 1000 mg daily  Will recheck A1c in 3 months, if stable will further go down on metformin or discontinue it  Continue gabapentin 300 mg t i d  Anemia due to chronic kidney disease  CBC shows hemoglobin of 10 6 likely secondary to chronic kidney disease, recommended starting ferrous sulfate 325 mg daily empirically  Will repeat CBC in 3 months, if hemoglobin is not improving, will discontinue ferrous sulfate    Hypercholesteremia  Reviewed FLP  , total cholesterol 254  Recommended starting atorvastatin 40 mg HS  Side effects reviewed  Lifestyle modification with diet and exercise     Stage 3 chronic kidney disease (HCC)  Creatinine 1 85, GFR 28 on most recent CMP  CKD likely secondary to hypertension and diabetes  Advised patient to avoid nephrotoxin medication  Will repeat CMP in 3 months    Diagnoses and all orders for this visit:    Hypercholesteremia  -     atorvastatin (LIPITOR) 40 mg tablet; Take 1 tablet (40 mg total) by mouth daily    Type 2 diabetes mellitus with complication (HCC)  -     metFORMIN (GLUCOPHAGE) 1000 MG tablet; Take 1 tablet (1,000 mg total) by mouth daily with breakfast    Anemia due to chronic kidney disease, unspecified CKD stage  -     ferrous sulfate 324 (65 Fe) mg; Take 1 tablet (324 mg total) by mouth 2 (two) times a day before meals  -     docusate sodium (COLACE) 100 mg capsule; Take 1 capsule (100 mg total) by mouth 2 (two) times a day    Type 2 diabetes mellitus with diabetic neuropathy, without long-term current use of insulin (HCC)         Subjective     Follow-up      71-year-old female presented to office for a follow-up visit and review of labs  She has no acute concerns today    Reports that she needs refills for metformin  She also states that she has been living with her daughter, and is waiting for her son to have housing, so that she can move in with him  She reports that she does not want to stay with her daughter and can't wait to move in with her son  The following portions of the patient's history were reviewed and updated as appropriate: allergies, current medications, past family history, past medical history, past social history, past surgical history and problem list     Review of Systems   Constitutional: Negative for chills and fever  HENT: Negative for congestion  Respiratory: Negative for shortness of breath  Cardiovascular: Negative for chest pain  Gastrointestinal: Negative for diarrhea, nausea and vomiting  Genitourinary: Negative for difficulty urinating  Musculoskeletal: Positive for gait problem and joint swelling  Neurological: Negative for headaches  Objective     Vitals:Blood pressure 122/80, pulse 80, temperature 98 4 °F (36 9 °C), resp  rate 18, height 5' (1 524 m), weight 71 6 kg (157 lb 12 8 oz)  Physical Exam:  Physical Exam   Constitutional: She is oriented to person, place, and time  She appears well-developed and well-nourished  HENT:   Head: Normocephalic and atraumatic  Mouth/Throat: Oropharynx is clear and moist    Eyes: Conjunctivae and EOM are normal    Neck: Normal range of motion  Neck supple  Cardiovascular: Normal rate and regular rhythm  Exam reveals no friction rub  Murmur heard  Pulmonary/Chest: Effort normal and breath sounds normal  No respiratory distress  She has no wheezes  She has no rales  Abdominal: Soft  Bowel sounds are normal  She exhibits no distension  There is no tenderness  Musculoskeletal: She exhibits no edema  Ambulates with the help of walker   Neurological: She is alert and oriented to person, place, and time  Skin: Skin is warm and dry         Lab Results:  Reviewed

## 2019-10-11 NOTE — ASSESSMENT & PLAN NOTE
CBC shows hemoglobin of 10 6 likely secondary to chronic kidney disease, recommended starting ferrous sulfate 325 mg daily empirically  Will repeat CBC in 3 months, if hemoglobin is not improving, will discontinue ferrous sulfate

## 2019-10-15 DIAGNOSIS — K21.9 GASTROESOPHAGEAL REFLUX DISEASE, ESOPHAGITIS PRESENCE NOT SPECIFIED: ICD-10-CM

## 2019-10-15 RX ORDER — PANTOPRAZOLE SODIUM 40 MG/1
TABLET, DELAYED RELEASE ORAL
Qty: 90 TABLET | Refills: 0 | Status: SHIPPED | OUTPATIENT
Start: 2019-10-15 | End: 2020-01-21

## 2019-10-18 ENCOUNTER — TELEPHONE (OUTPATIENT)
Dept: PAIN MEDICINE | Facility: CLINIC | Age: 74
End: 2019-10-18

## 2019-10-18 NOTE — TELEPHONE ENCOUNTER
Patient called requesting to schedule her procedure; please advise, jordan    Call back# [phone]

## 2019-11-20 DIAGNOSIS — N18.9 ANEMIA DUE TO CHRONIC KIDNEY DISEASE, UNSPECIFIED CKD STAGE: ICD-10-CM

## 2019-11-20 DIAGNOSIS — D63.1 ANEMIA DUE TO CHRONIC KIDNEY DISEASE, UNSPECIFIED CKD STAGE: ICD-10-CM

## 2019-11-20 RX ORDER — DOCUSATE SODIUM 100 MG/1
CAPSULE, LIQUID FILLED ORAL
Qty: 60 CAPSULE | Refills: 0 | Status: SHIPPED | OUTPATIENT
Start: 2019-11-20 | End: 2019-12-26 | Stop reason: SDUPTHER

## 2019-12-01 DIAGNOSIS — R52 PAIN: ICD-10-CM

## 2019-12-01 RX ORDER — GABAPENTIN 300 MG/1
CAPSULE ORAL
Qty: 270 CAPSULE | Refills: 0 | Status: SHIPPED | OUTPATIENT
Start: 2019-12-01 | End: 2020-02-26

## 2019-12-15 DIAGNOSIS — J30.9 ALLERGIC RHINITIS, UNSPECIFIED SEASONALITY, UNSPECIFIED TRIGGER: ICD-10-CM

## 2019-12-16 RX ORDER — LEVOCETIRIZINE DIHYDROCHLORIDE 5 MG/1
TABLET, FILM COATED ORAL
Qty: 30 TABLET | Refills: 0 | Status: SHIPPED | OUTPATIENT
Start: 2019-12-16 | End: 2020-01-21

## 2019-12-26 DIAGNOSIS — D63.1 ANEMIA DUE TO CHRONIC KIDNEY DISEASE, UNSPECIFIED CKD STAGE: ICD-10-CM

## 2019-12-26 DIAGNOSIS — N18.9 ANEMIA DUE TO CHRONIC KIDNEY DISEASE, UNSPECIFIED CKD STAGE: ICD-10-CM

## 2019-12-26 RX ORDER — DOCUSATE SODIUM 100 MG/1
CAPSULE, LIQUID FILLED ORAL
Qty: 60 CAPSULE | Refills: 0 | Status: SHIPPED | OUTPATIENT
Start: 2019-12-26 | End: 2020-01-31 | Stop reason: SDUPTHER

## 2020-01-09 ENCOUNTER — OFFICE VISIT (OUTPATIENT)
Dept: FAMILY MEDICINE CLINIC | Facility: CLINIC | Age: 75
End: 2020-01-09

## 2020-01-09 VITALS
SYSTOLIC BLOOD PRESSURE: 140 MMHG | DIASTOLIC BLOOD PRESSURE: 82 MMHG | RESPIRATION RATE: 18 BRPM | HEART RATE: 64 BPM | WEIGHT: 167.8 LBS | TEMPERATURE: 97.3 F | BODY MASS INDEX: 32.94 KG/M2 | HEIGHT: 60 IN

## 2020-01-09 DIAGNOSIS — E78.5 HYPERLIPIDEMIA, UNSPECIFIED HYPERLIPIDEMIA TYPE: ICD-10-CM

## 2020-01-09 DIAGNOSIS — Z13.5 SCREENING FOR DIABETIC RETINOPATHY: ICD-10-CM

## 2020-01-09 DIAGNOSIS — D12.6 TUBULAR ADENOMA OF COLON: ICD-10-CM

## 2020-01-09 DIAGNOSIS — E11.40 TYPE 2 DIABETES MELLITUS WITH DIABETIC NEUROPATHY, WITHOUT LONG-TERM CURRENT USE OF INSULIN (HCC): ICD-10-CM

## 2020-01-09 DIAGNOSIS — Z11.4 ENCOUNTER FOR SCREENING FOR HIV: ICD-10-CM

## 2020-01-09 DIAGNOSIS — N18.30 STAGE 3 CHRONIC KIDNEY DISEASE (HCC): Primary | ICD-10-CM

## 2020-01-09 DIAGNOSIS — M17.12 OSTEOARTHRITIS OF LEFT KNEE, UNSPECIFIED OSTEOARTHRITIS TYPE: ICD-10-CM

## 2020-01-09 DIAGNOSIS — R26.2 AMBULATORY DYSFUNCTION: ICD-10-CM

## 2020-01-09 LAB
LEFT EYE DIABETIC RETINOPATHY: NORMAL
LEFT EYE IMAGE QUALITY: NORMAL
LEFT EYE MACULAR EDEMA: NORMAL
LEFT EYE OTHER RETINOPATHY: NORMAL
RIGHT EYE DIABETIC RETINOPATHY: NORMAL
RIGHT EYE IMAGE QUALITY: NORMAL
RIGHT EYE MACULAR EDEMA: NORMAL
RIGHT EYE OTHER RETINOPATHY: NORMAL
SEVERITY (EYE EXAM): NORMAL

## 2020-01-09 PROCEDURE — 2025F 7 FLD RTA PHOTO W/O RTNOPTHY: CPT | Performed by: FAMILY MEDICINE

## 2020-01-09 PROCEDURE — 3066F NEPHROPATHY DOC TX: CPT | Performed by: FAMILY MEDICINE

## 2020-01-09 PROCEDURE — 3008F BODY MASS INDEX DOCD: CPT | Performed by: FAMILY MEDICINE

## 2020-01-09 PROCEDURE — 99213 OFFICE O/P EST LOW 20 MIN: CPT | Performed by: FAMILY MEDICINE

## 2020-01-09 PROCEDURE — 2023F DILAT RTA XM W/O RTNOPTHY: CPT | Performed by: FAMILY MEDICINE

## 2020-01-09 PROCEDURE — 1160F RVW MEDS BY RX/DR IN RCRD: CPT | Performed by: FAMILY MEDICINE

## 2020-01-09 NOTE — LETTER
Altheaerum RichardsToni may have Medical tab for entry into the building because of her Ambulatory Dysfunction       Aimee Onofre MD  1/9/20

## 2020-01-09 NOTE — PROGRESS NOTES
Assessment/Plan:    Hyperlipidemia  -LDL was 180 on last labs, total cholesterol was 254, she was started on Atorvastating 40mg Q HS  -Continue diet and lifestyle modifications  -Repeat lipid panel in 6 months     T2DM  -A1C was 6 4% on 8/28/19 on metformin 1000 Q day  -Continue Gabapentin 300mg TID  -will get diabetic eye exam today    CKD Stage III  -Creatinine was 1 85, GFR was 28 on most recent CMP  -Repeat CMP with next labs  -Avoid nephrotoxic drugs    HIV Screening  -pt requests screening for HIV,script given      Ambulatory Assistance  -Filled out form for wheelchair request from [facility] [facility], patient requests a scooter however her form is for a wheelchair     Black stools  Could be secondary to Ferrous sulfate, but pt was diagnosed with tubular adenoma of colon in 2018 and was told to get colonoscopy done in 2 years  Colonoscopy was done by Dr Betzaida Green  Referral provided again today  Osteoarthritis right knee  Patient follows with Sports Medicine, reports that her arthrosis has broken  Script for foot ankle arthrosis given  Diagnoses and all orders for this visit:    Stage 3 chronic kidney disease ([address] )  -     CBC and differential; Future  -     Comprehensive metabolic panel; Future    Type 2 diabetes mellitus with diabetic neuropathy, without long-term current use of insulin (HCC)    Hyperlipidemia, unspecified hyperlipidemia type  -     Lipid panel; Future    Encounter for screening for HIV  -     Human Immunodeficiency Virus 1/2 Antigen / Antibody ( Fourth Generation) with Reflex Testing; Future    Tubular adenoma of colon  -     Ambulatory referral to Gastroenterology;  Future    Osteoarthritis of left knee, unspecified osteoarthritis type  -     AFO Ankle Foot Orthotic Spring Wire Dorsiflexion Assist Calf Band    Screening for diabetic retinopathy  -     IRIS Diabetic eye exam    Other orders  -     Discontinue: Calcium Carbonate-Vitamin D 600-400 MG-UNIT per tablet              -Follow up in 3 months    Subjective:      Patient ID: Kyra Castillo is a 69 y o  female  HPI  68 y/o female with PMH significant for HLD, T2DM and stage III CKD presents to the office for a wellness check  She needs a paper faxed to [facility] so patient can get a scooter although the form says wheelchair on it  Patient takes all medications regularly as prescribed  Patient doesn't need refills on any medications  Most days, patient eats dinner and not breakfast or lunch  She says she eats whatever her daughter in law cooks  Patient lives in an apartment with her son, she requests a "medical tab" so she can get into the building easier and can avoid walking up a big hill  Patient has been noticing black stool for a couple of months  She said it started when she ate a lot of chicken nuggets and french fries when she was living with her daughter  Has a bowel movement once every two weeks  Constipation started around the same time as the black stools  Patient also requests HIV testing because previous partner was HIV positive  His doctor said his levels were low but he stopped taking the treatment after they   They  8 months ago  The following portions of the patient's history were reviewed and updated as appropriate: allergies, current medications, past family history, past medical history, past social history, past surgical history and problem list     Review of Systems   Constitutional: Negative for appetite change and fever  HENT: Negative for congestion, hearing loss and sore throat  Eyes: Negative for visual disturbance  Respiratory: Negative for shortness of breath  Cardiovascular: Negative for chest pain  Gastrointestinal: Positive for constipation  Negative for abdominal pain, anal bleeding, blood in stool, diarrhea, nausea and vomiting  Genitourinary: Negative for difficulty urinating, dysuria, frequency and urgency  Musculoskeletal: Negative for myalgias  Neurological: Negative for dizziness, light-headedness and headaches  Objective:      /82 (BP Location: Left arm, Patient Position: Sitting, Cuff Size: Standard)   Pulse 64   Temp (!) 97 3 °F (36 3 °C) (Tympanic)   Resp 18   Ht 5' (1 524 m)   Wt 76 1 kg (167 lb 12 8 oz)   BMI 32 77 kg/m²          Physical Exam   Constitutional: She is oriented to person, place, and time  She appears well-developed and well-nourished  No distress  HENT:   Head: Normocephalic and atraumatic  Mouth/Throat: No oropharyngeal exudate  Cardiovascular: Normal rate and regular rhythm  Pulmonary/Chest: Effort normal and breath sounds normal  No respiratory distress  She has no wheezes  Abdominal: Soft  Bowel sounds are normal  She exhibits no distension  Musculoskeletal: She exhibits no edema  Ambulates with the help of walker   Neurological: She is alert and oriented to person, place, and time  Skin: Skin is warm  She is not diaphoretic

## 2020-01-10 DIAGNOSIS — D63.1 ANEMIA DUE TO CHRONIC KIDNEY DISEASE, UNSPECIFIED CKD STAGE: ICD-10-CM

## 2020-01-10 DIAGNOSIS — N18.9 ANEMIA DUE TO CHRONIC KIDNEY DISEASE, UNSPECIFIED CKD STAGE: ICD-10-CM

## 2020-01-10 RX ORDER — FERROUS SULFATE TAB EC 324 MG (65 MG FE EQUIVALENT) 324 (65 FE) MG
TABLET DELAYED RESPONSE ORAL
Qty: 90 TABLET | Refills: 0 | OUTPATIENT
Start: 2020-01-10

## 2020-01-14 ENCOUNTER — PATIENT OUTREACH (OUTPATIENT)
Dept: FAMILY MEDICINE CLINIC | Facility: CLINIC | Age: 75
End: 2020-01-14

## 2020-01-14 NOTE — PROGRESS NOTES
CM called patient, left message with brief introduction and contact information on voice mail, with request for return phone call  CM requested patient to give CM the name of her new MA plan   Awaiting return phone call

## 2020-01-15 ENCOUNTER — TELEPHONE (OUTPATIENT)
Dept: FAMILY MEDICINE CLINIC | Facility: CLINIC | Age: 75
End: 2020-01-15

## 2020-01-15 DIAGNOSIS — H57.9 EYE EXAM ABNORMAL: Primary | ICD-10-CM

## 2020-01-15 NOTE — RESULT ENCOUNTER NOTE
Non drainable image, patient needs to see a retina specialist for further evaluation within 3 months  Referral for Ophthalmology placed    Please let patient now and mail the referral

## 2020-01-16 DIAGNOSIS — K21.9 GASTROESOPHAGEAL REFLUX DISEASE, ESOPHAGITIS PRESENCE NOT SPECIFIED: ICD-10-CM

## 2020-01-21 ENCOUNTER — TELEPHONE (OUTPATIENT)
Dept: OBGYN CLINIC | Facility: HOSPITAL | Age: 75
End: 2020-01-21

## 2020-01-21 DIAGNOSIS — J30.9 ALLERGIC RHINITIS, UNSPECIFIED SEASONALITY, UNSPECIFIED TRIGGER: ICD-10-CM

## 2020-01-21 RX ORDER — LEVOCETIRIZINE DIHYDROCHLORIDE 5 MG/1
TABLET, FILM COATED ORAL
Qty: 30 TABLET | Refills: 0 | Status: SHIPPED | OUTPATIENT
Start: 2020-01-21 | End: 2020-02-17

## 2020-01-21 RX ORDER — PANTOPRAZOLE SODIUM 40 MG/1
TABLET, DELAYED RELEASE ORAL
Qty: 90 TABLET | Refills: 0 | Status: SHIPPED | OUTPATIENT
Start: 2020-01-21 | End: 2020-04-16

## 2020-01-21 NOTE — TELEPHONE ENCOUNTER
Patient sees Dr Pearl Villegas  She is requesting a new Knee Brace script  The one she has currently is worn and her PCP suggested she get a new one          CB: [phone]

## 2020-01-29 ENCOUNTER — PATIENT OUTREACH (OUTPATIENT)
Dept: FAMILY MEDICINE CLINIC | Facility: CLINIC | Age: 75
End: 2020-01-29

## 2020-01-31 DIAGNOSIS — D63.1 ANEMIA DUE TO CHRONIC KIDNEY DISEASE, UNSPECIFIED CKD STAGE: ICD-10-CM

## 2020-01-31 DIAGNOSIS — N18.9 ANEMIA DUE TO CHRONIC KIDNEY DISEASE, UNSPECIFIED CKD STAGE: ICD-10-CM

## 2020-01-31 RX ORDER — DOCUSATE SODIUM 100 MG/1
CAPSULE, LIQUID FILLED ORAL
Qty: 60 CAPSULE | Refills: 0 | Status: SHIPPED | OUTPATIENT
Start: 2020-01-31 | End: 2020-02-28

## 2020-02-04 ENCOUNTER — PATIENT OUTREACH (OUTPATIENT)
Dept: FAMILY MEDICINE CLINIC | Facility: CLINIC | Age: 75
End: 2020-02-04

## 2020-02-04 DIAGNOSIS — Z78.9 NEED FOR FOLLOW-UP BY SOCIAL WORKER: Primary | ICD-10-CM

## 2020-02-04 NOTE — TELEPHONE ENCOUNTER
Patient is calling to request a new script for a knee brace  Her old one is worn out  Please send it to her mailing address on file, and let her know when we send it so she can go to her [address] to check it      Callback IG#[phone]

## 2020-02-04 NOTE — PROGRESS NOTES
No return call received from patient to date  Second call placed to patient today regarding patient's request for assistance with obtaining a scooter from [facility]  Spoke with patient who reports she is no longer in need of a scooter  She reports she has obtained a walker with four wheels and a seat and she is able to manage well with this device  Patient does request assistance with obtaining a prescription from [facility] for a new foot/leg brace  Call placed to Dr Nannette Carcamo office and was informed they will obtain order from MD and contact patient to arrange referral   Call to patient and informed her of same  Patient denies further needs at this time  Will continue to be available to provide support

## 2020-02-05 NOTE — TELEPHONE ENCOUNTER
I called patient & left a message for her  I am questioning what "Knee brace" she is referring to, as I went into her chart and looked under "Other Orders" and there is a script in there for a Left MAFO brace  I asked Nando Riley, from [facility] if she could look it up in their system to see if she has any sort of "Knee Brace" orders for her, and she does not, which is leading me to believe this is for her Ankle and Foot (MAFO)   Not a Knee brace  I left message for the patient to call me back to clarify what she is referring too

## 2020-02-05 NOTE — TELEPHONE ENCOUNTER
Probably best if the patient comes to the office with her knee brace since I do not recollect the type of brace that she has  She does not need an office visit for this  If it is a simple hinged knee brace then we would be able to dispense it in the office  If it's an  brace then I'll provide script accordingly

## 2020-02-13 DIAGNOSIS — Z00.00 PREVENTATIVE HEALTH CARE: ICD-10-CM

## 2020-02-14 RX ORDER — ASPIRIN 81 MG/1
TABLET, DELAYED RELEASE ORAL
Qty: 90 TABLET | Refills: 1 | Status: SHIPPED | OUTPATIENT
Start: 2020-02-14 | End: 2020-05-18 | Stop reason: SDUPTHER

## 2020-02-16 DIAGNOSIS — J30.9 ALLERGIC RHINITIS, UNSPECIFIED SEASONALITY, UNSPECIFIED TRIGGER: ICD-10-CM

## 2020-02-17 ENCOUNTER — TELEPHONE (OUTPATIENT)
Dept: GASTROENTEROLOGY | Facility: AMBULARY SURGERY CENTER | Age: 75
End: 2020-02-17

## 2020-02-17 RX ORDER — LEVOCETIRIZINE DIHYDROCHLORIDE 5 MG/1
TABLET, FILM COATED ORAL
Qty: 30 TABLET | Refills: 0 | Status: SHIPPED | OUTPATIENT
Start: 2020-02-17 | End: 2020-03-20

## 2020-02-17 NOTE — TELEPHONE ENCOUNTER
Letter sent to patient to call in and schedule repeat colonoscopy with Dr Betzaida Green per recall set

## 2020-02-26 DIAGNOSIS — R52 PAIN: ICD-10-CM

## 2020-02-26 RX ORDER — GABAPENTIN 300 MG/1
CAPSULE ORAL
Qty: 270 CAPSULE | Refills: 0 | Status: SHIPPED | OUTPATIENT
Start: 2020-02-26 | End: 2020-05-25 | Stop reason: SDUPTHER

## 2020-02-28 DIAGNOSIS — D63.1 ANEMIA DUE TO CHRONIC KIDNEY DISEASE, UNSPECIFIED CKD STAGE: ICD-10-CM

## 2020-02-28 DIAGNOSIS — N18.9 ANEMIA DUE TO CHRONIC KIDNEY DISEASE, UNSPECIFIED CKD STAGE: ICD-10-CM

## 2020-02-28 RX ORDER — DOCUSATE SODIUM 100 MG/1
CAPSULE, LIQUID FILLED ORAL
Qty: 60 CAPSULE | Refills: 0 | Status: SHIPPED | OUTPATIENT
Start: 2020-02-28 | End: 2020-04-03

## 2020-03-09 ENCOUNTER — TELEPHONE (OUTPATIENT)
Dept: GASTROENTEROLOGY | Facility: AMBULARY SURGERY CENTER | Age: 75
End: 2020-03-09

## 2020-03-20 DIAGNOSIS — J30.9 ALLERGIC RHINITIS, UNSPECIFIED SEASONALITY, UNSPECIFIED TRIGGER: ICD-10-CM

## 2020-03-20 RX ORDER — LEVOCETIRIZINE DIHYDROCHLORIDE 5 MG/1
TABLET, FILM COATED ORAL
Qty: 30 TABLET | Refills: 0 | Status: SHIPPED | OUTPATIENT
Start: 2020-03-20 | End: 2020-04-26

## 2020-03-21 DIAGNOSIS — E78.00 HYPERCHOLESTEREMIA: ICD-10-CM

## 2020-03-21 RX ORDER — ATORVASTATIN CALCIUM 40 MG/1
TABLET, FILM COATED ORAL
Qty: 90 TABLET | Refills: 1 | Status: SHIPPED | OUTPATIENT
Start: 2020-03-21 | End: 2020-09-14

## 2020-04-03 DIAGNOSIS — D63.1 ANEMIA DUE TO CHRONIC KIDNEY DISEASE, UNSPECIFIED CKD STAGE: ICD-10-CM

## 2020-04-03 DIAGNOSIS — N18.9 ANEMIA DUE TO CHRONIC KIDNEY DISEASE, UNSPECIFIED CKD STAGE: ICD-10-CM

## 2020-04-03 RX ORDER — DOCUSATE SODIUM 100 MG/1
CAPSULE, LIQUID FILLED ORAL
Qty: 60 CAPSULE | Refills: 0 | Status: SHIPPED | OUTPATIENT
Start: 2020-04-03 | End: 2020-05-01 | Stop reason: SDUPTHER

## 2020-04-16 DIAGNOSIS — K21.9 GASTROESOPHAGEAL REFLUX DISEASE, ESOPHAGITIS PRESENCE NOT SPECIFIED: ICD-10-CM

## 2020-04-16 RX ORDER — PANTOPRAZOLE SODIUM 40 MG/1
TABLET, DELAYED RELEASE ORAL
Qty: 90 TABLET | Refills: 0 | Status: SHIPPED | OUTPATIENT
Start: 2020-04-16 | End: 2020-07-17

## 2020-04-26 DIAGNOSIS — J30.9 ALLERGIC RHINITIS, UNSPECIFIED SEASONALITY, UNSPECIFIED TRIGGER: ICD-10-CM

## 2020-04-26 RX ORDER — LEVOCETIRIZINE DIHYDROCHLORIDE 5 MG/1
TABLET, FILM COATED ORAL
Qty: 30 TABLET | Refills: 0 | Status: SHIPPED | OUTPATIENT
Start: 2020-04-26 | End: 2020-05-27

## 2020-05-01 DIAGNOSIS — N18.9 ANEMIA DUE TO CHRONIC KIDNEY DISEASE, UNSPECIFIED CKD STAGE: ICD-10-CM

## 2020-05-01 DIAGNOSIS — D63.1 ANEMIA DUE TO CHRONIC KIDNEY DISEASE, UNSPECIFIED CKD STAGE: ICD-10-CM

## 2020-05-01 RX ORDER — DOCUSATE SODIUM 100 MG/1
CAPSULE, LIQUID FILLED ORAL
Qty: 60 CAPSULE | Refills: 0 | Status: SHIPPED | OUTPATIENT
Start: 2020-05-01 | End: 2020-05-27

## 2020-05-17 DIAGNOSIS — Z00.00 PREVENTATIVE HEALTH CARE: ICD-10-CM

## 2020-05-18 RX ORDER — ACETAMINOPHEN/DIPHENHYDRAMINE 500MG-25MG
TABLET ORAL
Qty: 90 TABLET | Refills: 1 | Status: SHIPPED | OUTPATIENT
Start: 2020-05-18 | End: 2020-08-10

## 2020-05-25 DIAGNOSIS — R52 PAIN: ICD-10-CM

## 2020-05-25 RX ORDER — GABAPENTIN 300 MG/1
CAPSULE ORAL
Qty: 270 CAPSULE | Refills: 0 | Status: SHIPPED | OUTPATIENT
Start: 2020-05-25 | End: 2020-08-19

## 2020-05-27 DIAGNOSIS — N18.9 ANEMIA DUE TO CHRONIC KIDNEY DISEASE, UNSPECIFIED CKD STAGE: ICD-10-CM

## 2020-05-27 DIAGNOSIS — J30.9 ALLERGIC RHINITIS, UNSPECIFIED SEASONALITY, UNSPECIFIED TRIGGER: ICD-10-CM

## 2020-05-27 DIAGNOSIS — D63.1 ANEMIA DUE TO CHRONIC KIDNEY DISEASE, UNSPECIFIED CKD STAGE: ICD-10-CM

## 2020-05-27 RX ORDER — LEVOCETIRIZINE DIHYDROCHLORIDE 5 MG/1
TABLET, FILM COATED ORAL
Qty: 30 TABLET | Refills: 0 | Status: SHIPPED | OUTPATIENT
Start: 2020-05-27 | End: 2020-06-29

## 2020-05-27 RX ORDER — DOCUSATE SODIUM 100 MG/1
CAPSULE, LIQUID FILLED ORAL
Qty: 60 CAPSULE | Refills: 0 | Status: SHIPPED | OUTPATIENT
Start: 2020-05-27 | End: 2020-06-29

## 2020-06-15 ENCOUNTER — TELEPHONE (OUTPATIENT)
Dept: FAMILY MEDICINE CLINIC | Facility: CLINIC | Age: 75
End: 2020-06-15

## 2020-06-16 ENCOUNTER — OFFICE VISIT (OUTPATIENT)
Dept: FAMILY MEDICINE CLINIC | Facility: CLINIC | Age: 75
End: 2020-06-16

## 2020-06-16 VITALS
DIASTOLIC BLOOD PRESSURE: 62 MMHG | RESPIRATION RATE: 18 BRPM | TEMPERATURE: 96.4 F | WEIGHT: 158.4 LBS | HEIGHT: 60 IN | BODY MASS INDEX: 31.1 KG/M2 | SYSTOLIC BLOOD PRESSURE: 132 MMHG | HEART RATE: 60 BPM

## 2020-06-16 DIAGNOSIS — Z12.31 ENCOUNTER FOR SCREENING MAMMOGRAM FOR BREAST CANCER: ICD-10-CM

## 2020-06-16 DIAGNOSIS — E11.40 TYPE 2 DIABETES MELLITUS WITH DIABETIC NEUROPATHY, WITHOUT LONG-TERM CURRENT USE OF INSULIN (HCC): ICD-10-CM

## 2020-06-16 DIAGNOSIS — Z12.2 ENCOUNTER FOR SCREENING FOR LUNG CANCER: ICD-10-CM

## 2020-06-16 DIAGNOSIS — E78.00 HYPERCHOLESTEREMIA: ICD-10-CM

## 2020-06-16 DIAGNOSIS — Z00.00 MEDICARE ANNUAL WELLNESS VISIT, SUBSEQUENT: Primary | ICD-10-CM

## 2020-06-16 DIAGNOSIS — Z78.0 ASYMPTOMATIC POSTMENOPAUSAL STATE: ICD-10-CM

## 2020-06-16 DIAGNOSIS — Z00.00 MEDICARE ANNUAL WELLNESS VISIT, INITIAL: ICD-10-CM

## 2020-06-16 DIAGNOSIS — R26.2 AMBULATORY DYSFUNCTION: ICD-10-CM

## 2020-06-16 DIAGNOSIS — Z60.9 HIGH RISK SOCIAL SITUATION: ICD-10-CM

## 2020-06-16 PROCEDURE — 4040F PNEUMOC VAC/ADMIN/RCVD: CPT | Performed by: FAMILY MEDICINE

## 2020-06-16 PROCEDURE — 1125F AMNT PAIN NOTED PAIN PRSNT: CPT | Performed by: FAMILY MEDICINE

## 2020-06-16 PROCEDURE — G0439 PPPS, SUBSEQ VISIT: HCPCS | Performed by: FAMILY MEDICINE

## 2020-06-16 PROCEDURE — 4004F PT TOBACCO SCREEN RCVD TLK: CPT | Performed by: FAMILY MEDICINE

## 2020-06-16 PROCEDURE — 2022F DILAT RTA XM EVC RTNOPTHY: CPT | Performed by: FAMILY MEDICINE

## 2020-06-16 PROCEDURE — 1170F FXNL STATUS ASSESSED: CPT | Performed by: FAMILY MEDICINE

## 2020-06-16 PROCEDURE — 3075F SYST BP GE 130 - 139MM HG: CPT | Performed by: FAMILY MEDICINE

## 2020-06-16 PROCEDURE — 3066F NEPHROPATHY DOC TX: CPT | Performed by: FAMILY MEDICINE

## 2020-06-16 PROCEDURE — 3008F BODY MASS INDEX DOCD: CPT | Performed by: FAMILY MEDICINE

## 2020-06-16 PROCEDURE — 99213 OFFICE O/P EST LOW 20 MIN: CPT | Performed by: FAMILY MEDICINE

## 2020-06-16 PROCEDURE — 3078F DIAST BP <80 MM HG: CPT | Performed by: FAMILY MEDICINE

## 2020-06-16 PROCEDURE — 1160F RVW MEDS BY RX/DR IN RCRD: CPT | Performed by: FAMILY MEDICINE

## 2020-06-16 SDOH — SOCIAL STABILITY - SOCIAL INSECURITY: PROBLEM RELATED TO SOCIAL ENVIRONMENT, UNSPECIFIED: Z60.9

## 2020-06-17 PROBLEM — Z60.9 HIGH RISK SOCIAL SITUATION: Status: ACTIVE | Noted: 2020-06-17

## 2020-06-22 ENCOUNTER — PATIENT OUTREACH (OUTPATIENT)
Dept: FAMILY MEDICINE CLINIC | Facility: CLINIC | Age: 75
End: 2020-06-22

## 2020-06-23 ENCOUNTER — APPOINTMENT (OUTPATIENT)
Dept: LAB | Facility: CLINIC | Age: 75
End: 2020-06-23
Payer: MEDICARE

## 2020-06-23 DIAGNOSIS — E11.40 TYPE 2 DIABETES MELLITUS WITH DIABETIC NEUROPATHY, WITHOUT LONG-TERM CURRENT USE OF INSULIN (HCC): ICD-10-CM

## 2020-06-23 DIAGNOSIS — Z00.00 MEDICARE ANNUAL WELLNESS VISIT, SUBSEQUENT: ICD-10-CM

## 2020-06-23 LAB — HCV AB SER QL: NORMAL

## 2020-06-23 PROCEDURE — 83036 HEMOGLOBIN GLYCOSYLATED A1C: CPT

## 2020-06-23 PROCEDURE — 86803 HEPATITIS C AB TEST: CPT

## 2020-06-23 PROCEDURE — 36415 COLL VENOUS BLD VENIPUNCTURE: CPT

## 2020-06-26 LAB — HBA1C MFR BLD: NORMAL %

## 2020-06-29 DIAGNOSIS — D63.1 ANEMIA DUE TO CHRONIC KIDNEY DISEASE, UNSPECIFIED CKD STAGE: ICD-10-CM

## 2020-06-29 DIAGNOSIS — J30.9 ALLERGIC RHINITIS, UNSPECIFIED SEASONALITY, UNSPECIFIED TRIGGER: ICD-10-CM

## 2020-06-29 DIAGNOSIS — N18.9 ANEMIA DUE TO CHRONIC KIDNEY DISEASE, UNSPECIFIED CKD STAGE: ICD-10-CM

## 2020-06-29 RX ORDER — DOCUSATE SODIUM 100 MG/1
CAPSULE, LIQUID FILLED ORAL
Qty: 60 CAPSULE | Refills: 0 | Status: SHIPPED | OUTPATIENT
Start: 2020-06-29 | End: 2020-07-28

## 2020-06-29 RX ORDER — LEVOCETIRIZINE DIHYDROCHLORIDE 5 MG/1
TABLET, FILM COATED ORAL
Qty: 30 TABLET | Refills: 0 | Status: SHIPPED | OUTPATIENT
Start: 2020-06-29 | End: 2020-07-28

## 2020-06-30 ENCOUNTER — PATIENT OUTREACH (OUTPATIENT)
Dept: FAMILY MEDICINE CLINIC | Facility: CLINIC | Age: 75
End: 2020-06-30

## 2020-07-08 ENCOUNTER — PATIENT OUTREACH (OUTPATIENT)
Dept: FAMILY MEDICINE CLINIC | Facility: CLINIC | Age: 75
End: 2020-07-08

## 2020-07-14 ENCOUNTER — PATIENT OUTREACH (OUTPATIENT)
Dept: FAMILY MEDICINE CLINIC | Facility: CLINIC | Age: 75
End: 2020-07-14

## 2020-07-14 NOTE — PROGRESS NOTES
CM called patient x2, Sandra Ortiz answered the phone, said hello, then hung up the phone both times  CM unable to talk with  Patient

## 2020-07-16 DIAGNOSIS — K21.9 GASTROESOPHAGEAL REFLUX DISEASE, ESOPHAGITIS PRESENCE NOT SPECIFIED: ICD-10-CM

## 2020-07-17 RX ORDER — PANTOPRAZOLE SODIUM 40 MG/1
TABLET, DELAYED RELEASE ORAL
Qty: 90 TABLET | Refills: 0 | Status: SHIPPED | OUTPATIENT
Start: 2020-07-17

## 2020-07-28 DIAGNOSIS — N18.9 ANEMIA DUE TO CHRONIC KIDNEY DISEASE, UNSPECIFIED CKD STAGE: ICD-10-CM

## 2020-07-28 DIAGNOSIS — D63.1 ANEMIA DUE TO CHRONIC KIDNEY DISEASE, UNSPECIFIED CKD STAGE: ICD-10-CM

## 2020-07-28 DIAGNOSIS — J30.9 ALLERGIC RHINITIS, UNSPECIFIED SEASONALITY, UNSPECIFIED TRIGGER: ICD-10-CM

## 2020-07-28 RX ORDER — LEVOCETIRIZINE DIHYDROCHLORIDE 5 MG/1
TABLET, FILM COATED ORAL
Qty: 30 TABLET | Refills: 0 | Status: SHIPPED | OUTPATIENT
Start: 2020-07-28 | End: 2020-08-24

## 2020-07-28 RX ORDER — DOCUSATE SODIUM 100 MG/1
CAPSULE, LIQUID FILLED ORAL
Qty: 60 CAPSULE | Refills: 0 | Status: SHIPPED | OUTPATIENT
Start: 2020-07-28

## 2020-08-10 DIAGNOSIS — Z00.00 PREVENTATIVE HEALTH CARE: ICD-10-CM

## 2020-08-10 RX ORDER — ACETAMINOPHEN/DIPHENHYDRAMINE 500MG-25MG
TABLET ORAL
Qty: 90 TABLET | Refills: 1 | Status: SHIPPED | OUTPATIENT
Start: 2020-08-10

## 2020-08-19 DIAGNOSIS — R52 PAIN: ICD-10-CM

## 2020-08-19 RX ORDER — GABAPENTIN 300 MG/1
CAPSULE ORAL
Qty: 270 CAPSULE | Refills: 0 | Status: SHIPPED | OUTPATIENT
Start: 2020-08-19

## 2020-08-22 DIAGNOSIS — J30.9 ALLERGIC RHINITIS, UNSPECIFIED SEASONALITY, UNSPECIFIED TRIGGER: ICD-10-CM

## 2020-08-24 RX ORDER — LEVOCETIRIZINE DIHYDROCHLORIDE 5 MG/1
TABLET, FILM COATED ORAL
Qty: 30 TABLET | Refills: 0 | Status: SHIPPED | OUTPATIENT
Start: 2020-08-24 | End: 2020-09-28

## 2020-09-14 DIAGNOSIS — E78.00 HYPERCHOLESTEREMIA: ICD-10-CM

## 2020-09-14 RX ORDER — ATORVASTATIN CALCIUM 40 MG/1
TABLET, FILM COATED ORAL
Qty: 90 TABLET | Refills: 1 | Status: SHIPPED | OUTPATIENT
Start: 2020-09-14

## 2020-09-15 NOTE — PROGRESS NOTES
Tobacco Cessation Counseling: Tobacco cessation counseling and education was provided  The patient is sincerely urged to quit consumption of tobacco  She is not ready to quit tobacco  The numerous health risks of tobacco consumption were discussed  If she decides to quit, there are a number of helpful adjunctive aids, and she can see me to discuss nicotine replacement therapy, chantix, or bupropion anytime in the future  BMI Counseling: Body mass index is 28 32 kg/m²  The BMI is appropriate for patient's age  Assessment/Plan:    Tubular adenoma of colon  Reprinted patient is referred to GI for colonoscopy  patient has existing order for social work referral   seen patient to get the  to help patient with making appointments   will follow closely, return 1 week    Diabetes mellitus with neurological manifestations ([facility] [address] )  Uncontrolled diabetes with questionable adherence with medication   patient's hemoglobin A1c was to elevated to read on office machine >15 0   patient's blood glucose was too elevated to read on office machine   patient has limited literacy and very poor understanding of her medications   explained to patient that or recommendation is for her to be evaluated and treated in the emergency room and to be hospitalized for management of her uncontrolled diabetes  explained to the patient that if she does not go to the emergency room, she can potentially die from elevated blood sugar/ DKA   patient verbalized understanding that she may die if she does not go to the ER  patient refuses to go to the ER, refuses transport to the ER via ambulance      Ambulatory dysfunction   Patient has baseline ambulatory dysfunction, denies any recent falls, currently uses a walker to ambulate   will refer to home health in regards to assessment of her debility    Will follow-up with additional question in regards to home environment and fall risk at next visit in 1 week    High risk social situation Patient has multiple social situations that is detrimental to her health  -patient has limited literacy to the point where she is having difficulty managing her medication and making appointments  -patient has  Limited access to transportation, needs to take bus for her appointments in addition to her ambulatory dysfunction (uses a walker)  - poor support system  -very poor insight in regards to her health    multiple clinical staff tried to convince patient to report to the emergency room for evaluation,  Patient refuses and continue to desire to go home    Subjective:      Patient ID: Tiarra Watson is a 77 y o  female  HPI    80-year-old female patient here for follow-up regarding her chronic medical conditions  Patient main concern is her difficulty with making medical appointment  Patient states she cannot read and was not able to follow-up with her previous doctor's recommendation of repeating the colonoscopy  Patient had  Colonoscopy completed on January 2018 that shows multiple polyps and was precancerous adenoma  Patient has difficulty with her medications and has trouble manage them herself  Review of Systems   Constitutional: Negative for appetite change, chills and fever  HENT: Negative for congestion, rhinorrhea and sore throat  Eyes:        Patient's glasses broke  Patient uses to read small print   Respiratory: Negative for shortness of breath  Cardiovascular: Negative for chest pain  Gastrointestinal: Negative for abdominal pain, constipation, diarrhea, nausea and vomiting  Rare sharp pain in LUQ about once a month, resolves spontaneously   Genitourinary: Negative for dysuria and hematuria  Musculoskeletal: Positive for gait problem  Uses a walker at baseline   Neurological: Negative for dizziness, light-headedness and headaches  Psychiatric/Behavioral: Negative for sleep disturbance       Objective:    /62 (BP Location: Left arm, Patient Position: Sitting, Cuff Size: Standard)   Pulse 62   Temp (!) 96 3 °F (35 7 °C) (Tympanic)   Resp 18   Ht 5' (1 524 m)   Wt 65 8 kg (145 lb)   BMI 28 32 kg/m²     Physical Exam  Vitals signs reviewed  Constitutional:       General: She is not in acute distress  Appearance: Normal appearance  She is not ill-appearing, toxic-appearing or diaphoretic  HENT:      Head: Normocephalic  Nose: Nose normal  No congestion or rhinorrhea  Eyes:      General:         Right eye: No discharge  Left eye: No discharge  Neck:      Musculoskeletal: Normal range of motion and neck supple  No neck rigidity or muscular tenderness  Vascular: No carotid bruit  Cardiovascular:      Rate and Rhythm: Normal rate and regular rhythm  Pulses: Normal pulses  Heart sounds: Normal heart sounds  Pulmonary:      Effort: Pulmonary effort is normal  No respiratory distress  Breath sounds: Wheezing present  Abdominal:      General: Abdomen is flat  Bowel sounds are normal       Palpations: Abdomen is soft  Musculoskeletal: Normal range of motion  General: No swelling  Lymphadenopathy:      Cervical: No cervical adenopathy  Skin:     General: Skin is warm and dry  Capillary Refill: Capillary refill takes less than 2 seconds  Coloration: Skin is not jaundiced  Neurological:      General: No focal deficit present  Mental Status: She is alert  Comments:  Poor ability to communicate /described /understand her medical condition   Psychiatric:         Mood and Affect: Mood normal       Comments: Slow speech, needs to explains thing in a very simple manner            KAY Glover    Family Medicine, PGY-3

## 2020-09-17 ENCOUNTER — OFFICE VISIT (OUTPATIENT)
Dept: FAMILY MEDICINE CLINIC | Facility: CLINIC | Age: 75
End: 2020-09-17

## 2020-09-17 VITALS
WEIGHT: 145 LBS | HEIGHT: 60 IN | TEMPERATURE: 96.3 F | HEART RATE: 62 BPM | RESPIRATION RATE: 18 BRPM | DIASTOLIC BLOOD PRESSURE: 62 MMHG | BODY MASS INDEX: 28.47 KG/M2 | SYSTOLIC BLOOD PRESSURE: 132 MMHG

## 2020-09-17 DIAGNOSIS — D12.6 TUBULAR ADENOMA OF COLON: ICD-10-CM

## 2020-09-17 DIAGNOSIS — R26.2 AMBULATORY DYSFUNCTION: ICD-10-CM

## 2020-09-17 DIAGNOSIS — E11.40 TYPE 2 DIABETES MELLITUS WITH DIABETIC NEUROPATHY, WITHOUT LONG-TERM CURRENT USE OF INSULIN (HCC): Primary | ICD-10-CM

## 2020-09-17 DIAGNOSIS — E11.8 TYPE 2 DIABETES MELLITUS WITH COMPLICATION (HCC): ICD-10-CM

## 2020-09-17 DIAGNOSIS — Z60.9 HIGH RISK SOCIAL SITUATION: ICD-10-CM

## 2020-09-17 LAB
CREAT UR-MCNC: 40.5 MG/DL
MICROALBUMIN UR-MCNC: 11.6 MG/L (ref 0–20)
MICROALBUMIN/CREAT 24H UR: 29 MG/G CREATININE (ref 0–30)

## 2020-09-17 PROCEDURE — 82948 REAGENT STRIP/BLOOD GLUCOSE: CPT | Performed by: FAMILY MEDICINE

## 2020-09-17 PROCEDURE — 82570 ASSAY OF URINE CREATININE: CPT | Performed by: FAMILY MEDICINE

## 2020-09-17 PROCEDURE — 99214 OFFICE O/P EST MOD 30 MIN: CPT | Performed by: FAMILY MEDICINE

## 2020-09-17 PROCEDURE — 83036 HEMOGLOBIN GLYCOSYLATED A1C: CPT | Performed by: FAMILY MEDICINE

## 2020-09-17 PROCEDURE — 82043 UR ALBUMIN QUANTITATIVE: CPT | Performed by: FAMILY MEDICINE

## 2020-09-17 RX ORDER — BLOOD SUGAR DIAGNOSTIC
STRIP MISCELLANEOUS
Qty: 420 EACH | Refills: 1 | Status: SHIPPED | OUTPATIENT
Start: 2020-09-17

## 2020-09-17 RX ORDER — BLOOD-GLUCOSE METER
EACH MISCELLANEOUS
Qty: 1 KIT | Refills: 0 | Status: SHIPPED | OUTPATIENT
Start: 2020-09-17

## 2020-09-17 RX ORDER — LANCETS
EACH MISCELLANEOUS
Qty: 100 EACH | Refills: 3 | Status: SHIPPED | OUTPATIENT
Start: 2020-09-17

## 2020-09-17 SDOH — SOCIAL STABILITY - SOCIAL INSECURITY: PROBLEM RELATED TO SOCIAL ENVIRONMENT, UNSPECIFIED: Z60.9

## 2020-09-17 NOTE — ASSESSMENT & PLAN NOTE
Patient has baseline ambulatory dysfunction, denies any recent falls, currently uses a walker to ambulate   will refer to home health in regards to assessment of her debility    Will follow-up with additional question in regards to home environment and fall risk at next visit in 1 week

## 2020-09-17 NOTE — ASSESSMENT & PLAN NOTE
Patient has multiple social situations that is detrimental to her health  -patient has limited literacy to the point where she is having difficulty managing her medication and making appointments  -patient has  Limited access to transportation, needs to take bus for her appointments in addition to her ambulatory dysfunction (uses a walker)  - poor support system  -very poor insight in regards to her health

## 2020-09-17 NOTE — ASSESSMENT & PLAN NOTE
Reprinted patient is referred to GI for colonoscopy  patient has existing order for social work referral   seen patient to get the  to help patient with making appointments   will follow closely, return 1 week

## 2020-09-17 NOTE — ASSESSMENT & PLAN NOTE
Uncontrolled diabetes with questionable adherence with medication   patient's hemoglobin A1c was to elevated to read on office machine >15 0   patient's blood glucose was too elevated to read on office machine   patient has limited literacy and very poor understanding of her medications   explained to patient that or recommendation is for her to be evaluated and treated in the emergency room and to be hospitalized for management of her uncontrolled diabetes  explained to the patient that if she does not go to the emergency room, she can potentially die from elevated blood sugar/ DKA   patient verbalized understanding that she may die if she does not go to the ER  patient refuses to go to the ER, refuses transport to the ER via ambulance

## 2020-09-18 ENCOUNTER — PATIENT OUTREACH (OUTPATIENT)
Dept: FAMILY MEDICINE CLINIC | Facility: CLINIC | Age: 75
End: 2020-09-18

## 2020-09-18 DIAGNOSIS — Z78.9 NEED FOR FOLLOW-UP BY SOCIAL WORKER: Primary | ICD-10-CM

## 2020-09-18 LAB
SL AMB POCT GLUCOSE BLD: ABNORMAL
SL AMB POCT HEMOGLOBIN AIC: 15 (ref ?–6.5)

## 2020-09-18 NOTE — PROGRESS NOTES
Received request to meet with patient during PCP office visit to assess need for assistance at home  Met with patient regarding same  Patient reports that she resides alone in her apartment  She has one son who provides some support and assists with shopping  Patient identifies her primary problem as not being able to read and therefore she is unable to make follow up MD appointments that have been recommended by her PCP  Patient is requesting assistance with these appointments and they can be scheduled by Jorge Gonsales Referral Specialist and Mei Smallwood is aware  Reviewed home support services including Waiver program however patient declines having Waiver services at this time  She is agreeable with [facility] referral for BS and medication teaching and referral has been ordered  She reports that she takes care of her light housekeeping and she is independent with ADL's and uses a shower bench in her shower with a grab bar on the wall  Patient does admit that she also struggles with transportation as she has been using the public bus  She ambulates with a roller walker and therefore this is difficult  She reports she has been told not to use [facility] when she complained to them about their services however this was several years ago  [facility] Lazaro has been completed with patient today and mailed to [facility] office  MD reports that patient BS is currently very high and she is in need of Emergency treatment and recommends ambulance transport for patient to ER  Patient declines transfer to ER and MD has educated patient on risks of not receiving treatment including possible death  Patient continues to refuse transport to ER and states that she wants to return home  Patient states "you are holding me prisoner here "  Call to patient's son however no answer and no message option  Supportive counseling provided to patient  Rdoo mcfarlane signed by patient and arrangements made for her transport home from appointment today  Patient denies further needs at this time  Will continue to be available to provide support

## 2020-09-21 ENCOUNTER — TELEPHONE (OUTPATIENT)
Dept: FAMILY MEDICINE CLINIC | Facility: CLINIC | Age: 75
End: 2020-09-21

## 2020-09-21 DIAGNOSIS — J30.9 ALLERGIC RHINITIS, UNSPECIFIED SEASONALITY, UNSPECIFIED TRIGGER: ICD-10-CM

## 2020-09-21 NOTE — TELEPHONE ENCOUNTER
Adolfo Jiménez from [facility] 's office called stating that Samuel Viola has passed away and she was looking for Dr Naif Whalen who last saw Samuel Rod if he was able to Sign the certificate, Adolfo Jiménez can be reached at [phone]

## 2020-09-22 NOTE — TELEPHONE ENCOUNTER
Cheo Daughters calling from [address] [place] [address] and stated, " I am faxing the form that needs to be filled out by Dr Traci Dewitt and faxed back to Cheo Barton at the  home "    Fax # is [phone]

## 2020-09-28 RX ORDER — LEVOCETIRIZINE DIHYDROCHLORIDE 5 MG/1
TABLET, FILM COATED ORAL
Qty: 30 TABLET | Refills: 0 | Status: SHIPPED | OUTPATIENT
Start: 2020-09-28